# Patient Record
Sex: FEMALE | Race: WHITE | Employment: FULL TIME | URBAN - METROPOLITAN AREA
[De-identification: names, ages, dates, MRNs, and addresses within clinical notes are randomized per-mention and may not be internally consistent; named-entity substitution may affect disease eponyms.]

---

## 2017-11-30 ENCOUNTER — OFFICE VISIT (OUTPATIENT)
Dept: FAMILY MEDICINE | Facility: CLINIC | Age: 47
End: 2017-11-30
Payer: COMMERCIAL

## 2017-11-30 VITALS
BODY MASS INDEX: 24.51 KG/M2 | WEIGHT: 133.2 LBS | HEIGHT: 62 IN | HEART RATE: 71 BPM | SYSTOLIC BLOOD PRESSURE: 101 MMHG | DIASTOLIC BLOOD PRESSURE: 61 MMHG | TEMPERATURE: 97.8 F

## 2017-11-30 DIAGNOSIS — M54.42 ACUTE BILATERAL LOW BACK PAIN WITH LEFT-SIDED SCIATICA: Primary | ICD-10-CM

## 2017-11-30 DIAGNOSIS — K59.00 CONSTIPATION, UNSPECIFIED CONSTIPATION TYPE: ICD-10-CM

## 2017-11-30 PROCEDURE — 99213 OFFICE O/P EST LOW 20 MIN: CPT | Performed by: NURSE PRACTITIONER

## 2017-11-30 RX ORDER — ACETAMINOPHEN 500 MG
1000 TABLET ORAL EVERY 6 HOURS PRN
COMMUNITY
End: 2023-08-15

## 2017-11-30 RX ORDER — CYCLOBENZAPRINE HCL 10 MG
5-10 TABLET ORAL 3 TIMES DAILY PRN
Qty: 30 TABLET | Refills: 1 | Status: SHIPPED | OUTPATIENT
Start: 2017-11-30 | End: 2018-01-04

## 2017-11-30 RX ORDER — IBUPROFEN 200 MG
600 TABLET ORAL EVERY 6 HOURS PRN
COMMUNITY
End: 2023-08-15

## 2017-11-30 RX ORDER — HYDROCODONE BITARTRATE AND ACETAMINOPHEN 5; 325 MG/1; MG/1
1-2 TABLET ORAL EVERY 4 HOURS PRN
Qty: 10 TABLET | Refills: 0 | Status: SHIPPED | OUTPATIENT
Start: 2017-11-30 | End: 2018-01-04

## 2017-11-30 ASSESSMENT — PAIN SCALES - GENERAL: PAINLEVEL: SEVERE PAIN (7)

## 2017-11-30 NOTE — PATIENT INSTRUCTIONS
Thank you for choosing [facility].  You may be receiving a survey in the mail from Rohan Jaimes regarding your visit today.  Please take a few minutes to complete and return the survey to let us know how we are doing.      If you have questions or concerns, please contact us via Visual Realm or you can contact your care team at [phone].    Our Clinic hours are:  Monday 6:40 am  to 7:00 pm  Tuesday -Friday 6:40 am to 5:00 pm    The [place] outpatient lab hours are:  Monday - Friday 6:10 am to 4:45 pm  Saturdays 7:00 am to 11:00 am  Appointments are required, call [phone]    If you have clinical questions after hours or would like to schedule an appointment,  call the clinic at [phone].

## 2017-11-30 NOTE — MR AVS SNAPSHOT
After Visit Summary   11/30/2017    Deneen Hyde    MRN: [id number]           Patient Information     Date Of Birth          1970        Visit Information        Provider Department      11/30/2017 1:40 PM Leann Cisneros APRN [facility]        Today's Diagnoses     Acute bilateral low back pain with left-sided sciatica    -  1      Care Instructions          Thank you for choosing [facility].  You may be receiving a survey in the mail from Rohan LermaNBA [employer] regarding your visit today.  Please take a few minutes to complete and return the survey to let us know how we are doing.      If you have questions or concerns, please contact us via Yasuu or you can contact your care team at [phone].    Our Clinic hours are:  Monday 6:40 am  to 7:00 pm  Tuesday -Friday 6:40 am to 5:00 pm    The [place] outpatient lab hours are:  Monday - Friday 6:10 am to 4:45 pm  Saturdays 7:00 am to 11:00 am  Appointments are required, call [phone]    If you have clinical questions after hours or would like to schedule an appointment,  call the clinic at [phone].            Follow-ups after your visit        Additional Services     ORTHO  REFERRAL       [facility] is referring you to the Orthopedic  Services at [facility].       The  Representative will assist you in the coordination of your Orthopedic and Musculoskeletal Care as prescribed by your physician.    The  Representative will call you within 1 business day to help schedule your appointment, or you may contact the  Representative at:    All areas ~ [phone]     Type of Referral : Non Surgical       Timeframe requested: Routine    Coverage of these services is subject to the terms and limitations of your health insurance plan.  Please call member services at your health plan with any benefit or coverage questions.      If X-rays, CT  "or MRI's have been performed, please contact the facility where they were done to arrange for , prior to your scheduled appointment.  Please bring this referral request to your appointment and present it to your specialist.            PHYSICAL THERAPY REFERRAL       *This therapy referral will be filtered to a centralized scheduling office at [facility] and the patient will receive a call to schedule an appointment at a [facility] location most convenient for them. *     [facility] provides Physical Therapy evaluation and treatment and many specialty services across the [facility] system.  If requesting a specialty program, please choose from the list below.    If you have not heard from the scheduling office within 2 business days, please call [phone] for all locations, with the exception of [place], please call [phone].  Treatment: Evaluation & Treatment  Special Instructions/Modalities:   Special Programs: None    Please be aware that coverage of these services is subject to the terms and limitations of your health insurance plan.  Call member services at your health plan with any benefit or coverage questions.      **Note to Provider:  If you are referring outside of [facility] for the therapy appointment, please list the name of the location in the \"special instructions\" above, print the referral and give to the patient to schedule the appointment.                  Who to contact     If you have questions or need follow up information about today's clinic visit or your schedule please contact [facility] directly at [phone].  Normal or non-critical lab and imaging results will be communicated to you by MyChart, letter or phone within 4 business days after the clinic has received the results. If you do not hear from us within 7 days, please contact the clinic through MyChart or phone. If you have a critical or abnormal lab result, we will " "notify you by phone as soon as possible.  Submit refill requests through [employer] or call your pharmacy and they will forward the refill request to us. Please allow 3 business days for your refill to be completed.          Additional Information About Your Visit        ImpulcityharGetMyBoat Information     [employer] lets you send messages to your doctor, view your test results, renew your prescriptions, schedule appointments and more. To sign up, go to [email or web address] . Click on \"Log in\" on the left side of the screen, which will take you to the Welcome page. Then click on \"Sign up Now\" on the right side of the page.     You will be asked to enter the access code listed below, as well as some personal information. Please follow the directions to create your username and password.     Your access code is: [email or web address]  Expires: 2018  2:14 PM     Your access code will  in 90 days. If you need help or a new code, please call your [place] clinic or [phone].        Care EveryWhere ID     This is your Care EveryWhere ID. This could be used by other organizations to access your [place] medical records  [id number]        Your Vitals Were     Pulse Temperature Height BMI (Body Mass Index)          71 97.8  F (36.6  C) (Tympanic) 5' 2\" (1.575 m) 24.36 kg/m2         Blood Pressure from Last 3 Encounters:   17 101/61    Weight from Last 3 Encounters:   17 133 lb 3.2 oz (60.4 kg)              We Performed the Following     ORTHO  REFERRAL     PHYSICAL THERAPY REFERRAL          Today's Medication Changes          These changes are accurate as of: 17  2:14 PM.  If you have any questions, ask your nurse or doctor.               Start taking these medicines.        Dose/Directions    cyclobenzaprine 10 MG tablet   Commonly known as:  FLEXERIL   Used for:  Acute bilateral low back pain with left-sided sciatica   Started by:  Leann Cisneros APRN CNP        Dose:  5-10 mg   Take 0.5-1 tablets " (5-10 mg) by mouth 3 times daily as needed for muscle spasms   Quantity:  30 tablet   Refills:  1       HYDROcodone-acetaminophen 5-325 MG per tablet   Commonly known as:  NORCO   Used for:  Acute bilateral low back pain with left-sided sciatica   Started by:  Leann Cisneros APRN CNP        Dose:  1-2 tablet   Take 1-2 tablets by mouth every 4 hours as needed for moderate to severe pain maximum 2 tablet(s) per day   Quantity:  10 tablet   Refills:  0            Where to get your medicines      These medications were sent to [place] [address] [facility] - [address] [address]     Phone:  [phone]     cyclobenzaprine 10 MG tablet         Some of these will need a paper prescription and others can be bought over the counter.  Ask your nurse if you have questions.     Bring a paper prescription for each of these medications     HYDROcodone-acetaminophen 5-325 MG per tablet                Primary Care Provider Office Phone # Fax #    [facility] [phone] [address] [facility] [address]        Equal Access to Services     BOAZ HERNANDEZ : Hadii akira pugh hadasho Sozelalem, waaxda luqadaha, qaybta kaalmada adeduyen, otny solano. So [facility] [phone].    ATENCIÓN: Si habla español, tiene a mohamud disposición servicios gratuitos de asistencia lingüística. Giuliana al [phone].    We comply with applicable federal civil rights laws and [place] laws. We do not discriminate on the basis of race, color, national origin, age, disability, sex, sexual orientation, or gender identity.            Thank you!     Thank you for choosing [facility]  for your care. Our goal is always to provide you with excellent care. Hearing back from our patients is one way we can continue to improve our services. Please take a few minutes to complete the written survey that you may receive in the mail after your  visit with us. Thank you!             Your Updated Medication List - Protect others around you: Learn how to safely use, store and throw away your medicines at www.disposemymeds.org.          This list is accurate as of: 11/30/17  2:14 PM.  Always use your most recent med list.                   Brand Name Dispense Instructions for use Diagnosis    acetaminophen 500 MG tablet    TYLENOL     Take 1,000 mg by mouth every 6 hours as needed for mild pain        CALCIUM PO      1 tablet daily when she remembers , unknown dose        cyclobenzaprine 10 MG tablet    FLEXERIL    30 tablet    Take 0.5-1 tablets (5-10 mg) by mouth 3 times daily as needed for muscle spasms    Acute bilateral low back pain with left-sided sciatica       HYDROcodone-acetaminophen 5-325 MG per tablet    NORCO    10 tablet    Take 1-2 tablets by mouth every 4 hours as needed for moderate to severe pain maximum 2 tablet(s) per day    Acute bilateral low back pain with left-sided sciatica       ibuprofen 200 MG tablet    ADVIL/MOTRIN     Take 600 mg by mouth every 6 hours as needed for mild pain

## 2017-11-30 NOTE — PROGRESS NOTES
SUBJECTIVE:   Deneen Hyde is a 47 year old female who presents to clinic today for the following health issues:      Back Pain       Duration:  1 week         Specific cause: possibly from sleeping on her stomach     Description:   Location of pain: low back left  Character of pain: sharp, dull ache and stabbing, tightness  Pain radiation:none  New numbness or weakness in legs, not attributed to pain:  no     Intensity: Currently 6-7/10, At its worst 8-9/10    History:   Pain interferes with job: YES  History of back problems: no prior back problems  Any previous MRI or X-rays: None  Sees a specialist for back pain:  No  Therapies tried without relief: acetaminophen (Tylenol), cold and NSAIDs    Alleviating factors:   Improved by: standing up      Precipitating factors:  Worsened by: Lifting, Bending, Sitting and Lying Flat    Functional and Psychosocial Screen (Rebecca STarT Back):      Not performed today          Accompanying Signs & Symptoms:  Risk of Fracture:  None  Risk of Cauda Equina:  None  Risk of Infection:  None  Risk of Cancer:  None  Risk of Ankylosing Spondylitis:  Onset at age <35, male, AND morning back stiffness. no     No previous injury-   Progressively getting worse in past week  Stabbing pain lower back into left side. Pain does not radiate. No numbness or tingling or weakness in legs No other significant past medical history or family history. Changes with bowel or bladder.   Has problems with certain movements.     Constipation- feels constipated a lot- does not want to use      -------------------------------------    Problem list and histories reviewed & adjusted, as indicated.  Additional history: as documented    There is no problem list on file for this patient.    History reviewed. No pertinent surgical history.    Social History   Substance Use Topics     Smoking status: Former Smoker     Quit date: 11/30/2007     Smokeless tobacco: Never Used     Alcohol use No     Family  "History   Problem Relation Age of Onset     Thyroid Disease Mother      Suicide Father      Suicide Sister      Thyroid Disease Sister              Reviewed and updated as needed this visit by clinical staffTobacco  Allergies  Meds  Med Hx  Surg Hx  Fam Hx  Soc Hx      Reviewed and updated as needed this visit by Provider         ROS:  Constitutional, HEENT, cardiovascular, pulmonary, GI, , musculoskeletal, neuro, skin, endocrine and psych systems are negative, except as otherwise noted.      OBJECTIVE:   /61 (BP Location: Left arm, Patient Position: Chair, Cuff Size: Adult Large)  Pulse 71  Temp 97.8  F (36.6  C) (Tympanic)  Ht 5' 2\" (1.575 m)  Wt 133 lb 3.2 oz (60.4 kg)  BMI 24.36 kg/m2  Body mass index is 24.36 kg/(m^2).  GENERAL: healthy, alert and no distress  NECK: no adenopathy, no asymmetry, masses, or scars and thyroid normal to palpation  RESP: lungs clear to auscultation - no rales, rhonchi or wheezes  CV: regular rate and rhythm, normal S1 S2, no S3 or S4, no murmur, click or rub, no peripheral edema and peripheral pulses strong  MS: no gross musculoskeletal defects noted, no edema    Diagnostic Test Results:  none     ASSESSMENT/PLAN:       1. Acute bilateral low back pain with left-sided sciatica    - cyclobenzaprine (FLEXERIL) 10 MG tablet; Take 0.5-1 tablets (5-10 mg) by mouth 3 times daily as needed for muscle spasms  Dispense: 30 tablet; Refill: 1  - HYDROcodone-acetaminophen (NORCO) 5-325 MG per tablet; Take 1-2 tablets by mouth every 4 hours as needed for moderate to severe pain maximum 2 tablet(s) per day  Dispense: 10 tablet; Refill: 0  - PHYSICAL THERAPY REFERRAL  - ORTHO  REFERRAL    2. Constipation  - encouraged to increase fiber - water in diet  - Start Miralax     GIOVANI Delong [facility]  "

## 2017-11-30 NOTE — NURSING NOTE
"Chief Complaint   Patient presents with     Back Pain     Health Maintenance     declines flu shot, unsure if still needs pappe, had hysterectomy last year, not sure what type.        Initial /61 (BP Location: Left arm, Patient Position: Chair, Cuff Size: Adult Large)  Pulse 71  Temp 97.8  F (36.6  C) (Tympanic)  Ht 5' 2\" (1.575 m)  Wt 133 lb 3.2 oz (60.4 kg)  BMI 24.36 kg/m2 Estimated body mass index is 24.36 kg/(m^2) as calculated from the following:    Height as of this encounter: 5' 2\" (1.575 m).    Weight as of this encounter: 133 lb 3.2 oz (60.4 kg).  Medication Reconciliation: complete    "

## 2017-12-04 ENCOUNTER — HOSPITAL ENCOUNTER (OUTPATIENT)
Dept: PHYSICAL THERAPY | Facility: CLINIC | Age: 47
Setting detail: THERAPIES SERIES
End: 2017-12-04
Attending: NURSE PRACTITIONER
Payer: COMMERCIAL

## 2017-12-04 PROCEDURE — 97161 PT EVAL LOW COMPLEX 20 MIN: CPT | Mod: GP | Performed by: PHYSICAL THERAPIST

## 2017-12-04 PROCEDURE — 97110 THERAPEUTIC EXERCISES: CPT | Mod: GP | Performed by: PHYSICAL THERAPIST

## 2017-12-04 PROCEDURE — 40000718 ZZHC STATISTIC PT DEPARTMENT ORTHO VISIT: Performed by: PHYSICAL THERAPIST

## 2017-12-04 PROCEDURE — 97140 MANUAL THERAPY 1/> REGIONS: CPT | Mod: GP | Performed by: PHYSICAL THERAPIST

## 2017-12-04 NOTE — PROGRESS NOTES
Deneen Hyde   PHYSICAL THERAPY EVALUATION    12/04/17 0800   General Information   Type of Visit Initial OP Ortho PT Evaluation   Start of Care Date 12/04/17   Referring Physician NEGRA Cisneros NP   Patient/Family Goals Statement decrease pain, be able to move, get back to exercising   Orders Evaluate and Treat   Date of Order 11/30/17   Insurance Type Private   Medical Diagnosis LBP   Surgical/Medical history reviewed Yes  (hysterectomy yr ago, )   Body Part(s)   Body Part(s) Lumbar Spine/SI   Presentation and Etiology   Pertinent history of current problem (include personal factors and/or comorbidities that impact the POC) LBP slowly getting worse since Thanksgiving. sx can go to  L butt.  Thinks from sleeping on her stomach.  Sx increase getting up from sitting, getting out of bed, bending.  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  Pain can be 8/10 at worst getting out of bed, 5/10 average.   Onset date of current episode/exacerbation 11/22/17   Prior Level of Function   Functional Level Prior Comment works, house, yardwork, 3 kids   Current Level of Function   Patient role/employment history A. Employed   Employment Comments teacher K-5   Fall Risk Screen   Fall screen completed by PT   Have you fallen 2 or more times in the past year? No   Have you fallen and had an injury in the past year? No   Is patient a fall risk? No   Lumbar Spine/SI Objective Findings   Posture L ot R scoliosis starting in mid to lower T spine, deep lordotic curve in upper lumbar   Flexion ROM 10% increased pain   Extension ROM 80%    Right Side Bending ROM 75% no curve in spine   Left Side Bending ROM 50% pain L   Lumbar ROM Comment full supine flexion, stretching LB, and eventually felt better   Pelvic Screen L post ilium, (or upslip)   Hip Screen ROM WNL   Hamstring Flexibility WNL   Piriformis Flexibility pulls L   SLR neg   Crossover SLR neg   Slump Test painful in LB without extending leg - however forward bending  stretch, pulled but felt good   Segmental Mobility ERSL L3-4, FRSR L1   Palpation tender L SI, piriformis   Planned Therapy Interventions   Planned Therapy Interventions stretching;strengthening;manual therapy;joint mobilization   Clinical Impression   Criteria for Skilled Therapeutic Interventions Met yes, treatment indicated   PT Diagnosis LBP   Functional limitations due to impairments changing positions, getting out of bed, up from chair, bending   Clinical Presentation Evolving/Changing   Clinical Presentation Rationale no factors affecting plan   Clinical Decision Making (Complexity) Low complexity   Therapy Frequency 1 time/week   Predicted Duration of Therapy Intervention (days/wks) 4-6wks   Risk & Benefits of therapy have been explained Yes   Patient, Family & other staff in agreement with plan of care Yes   Education Assessment   Preferred Learning Style Listening   Barriers to Learning No barriers   ORTHO GOALS   PT Ortho Eval Goals 1;2   Ortho Goal 1   Goal Description pt will be able to do all bed mobility without LBP in 4wk   Target Date 01/04/18   Ortho Goal 2   Goal Description pt will have full LROM without pain for improved ADLs, house tasks in 6wk   Target Date 01/15/18   Total Evaluation Time   Total Evaluation Time 20   Kris Hoenk, PT #[id number]  [facility]

## 2017-12-21 ENCOUNTER — OFFICE VISIT (OUTPATIENT)
Dept: FAMILY MEDICINE | Facility: CLINIC | Age: 47
End: 2017-12-21
Payer: COMMERCIAL

## 2017-12-21 VITALS
TEMPERATURE: 99 F | DIASTOLIC BLOOD PRESSURE: 68 MMHG | HEART RATE: 72 BPM | SYSTOLIC BLOOD PRESSURE: 92 MMHG | BODY MASS INDEX: 24.11 KG/M2 | WEIGHT: 131.8 LBS

## 2017-12-21 DIAGNOSIS — K40.90 UNILATERAL INGUINAL HERNIA WITHOUT OBSTRUCTION OR GANGRENE, RECURRENCE NOT SPECIFIED: Primary | ICD-10-CM

## 2017-12-21 PROCEDURE — 99213 OFFICE O/P EST LOW 20 MIN: CPT | Performed by: FAMILY MEDICINE

## 2017-12-21 NOTE — NURSING NOTE
"Initial BP 92/68  Pulse 72  Temp 99  F (37.2  C) (Tympanic)  Wt 131 lb 12.8 oz (59.8 kg)  BMI 24.11 kg/m2 Estimated body mass index is 24.11 kg/(m^2) as calculated from the following:    Height as of 11/30/17: 5' 2\" (1.575 m).    Weight as of this encounter: 131 lb 12.8 oz (59.8 kg). .      "

## 2017-12-21 NOTE — MR AVS SNAPSHOT
After Visit Summary   12/21/2017    Deneen Hyde    MRN: [id number]           Patient Information     Date Of Birth          1970        Visit Information        Provider Department      12/21/2017 2:00 PM Pallavi Montalvo MD [facility]        Today's Diagnoses     Unilateral inguinal hernia without obstruction or gangrene, recurrence not specified    -  1       Follow-ups after your visit        Additional Services     GENERAL SURG ADULT REFERRAL       Your provider has referred you to: [facility]: [facility] [phone]   [email or web address]    Please be aware that coverage of these services is subject to the terms and limitations of your health insurance plan.  Call member services at your health plan with any benefit or coverage questions.      Please bring the following with you to your appointment:    (1) Any X-Rays, CTs or MRIs which have been performed.  Contact the facility where they were done to arrange for  prior to your scheduled appointment.   (2) List of current medications   (3) This referral request   (4) Any documents/labs given to you for this referral                  Who to contact     If you have questions or need follow up information about today's clinic visit or your schedule please contact [facility] directly at [phone].  Normal or non-critical lab and imaging results will be communicated to you by MyChart, letter or phone within 4 business days after the clinic has received the results. If you do not hear from us within 7 days, please contact the clinic through MyChart or phone. If you have a critical or abnormal lab result, we will notify you by phone as soon as possible.  Submit refill requests through [employer] or call your pharmacy and they will forward the refill request to us. Please allow 3 business days for your refill to be completed.          Additional Information  "About Your Visit        MyChart Information     [employer] lets you send messages to your doctor, view your test results, renew your prescriptions, schedule appointments and more. To sign up, go to [email or web address] . Click on \"Log in\" on the left side of the screen, which will take you to the Welcome page. Then click on \"Sign up Now\" on the right side of the page.     You will be asked to enter the access code listed below, as well as some personal information. Please follow the directions to create your username and password.     Your access code is: [email or web address]  Expires: 2018  2:14 PM     Your access code will  in 90 days. If you need help or a new code, please call your [place] clinic or [phone].        Care EveryWhere ID     This is your Care EveryWhere ID. This could be used by other organizations to access your [place] medical records  [id number]        Your Vitals Were     Pulse Temperature BMI (Body Mass Index)             72 99  F (37.2  C) (Tympanic) 24.11 kg/m2          Blood Pressure from Last 3 Encounters:   17 92/68   17 101/61    Weight from Last 3 Encounters:   17 131 lb 12.8 oz (59.8 kg)   17 133 lb 3.2 oz (60.4 kg)              We Performed the Following     GENERAL SURG ADULT REFERRAL        Primary Care Provider Office Phone # Fax #    Leann Cisneros, APRN [facility] [phone] [address] [facility] [address]        Equal Access to Services     : Hadii akira pugh hadasho Soomaali, waaxda luqadaha, qaybta kaalmada manda, tony ortega . So [facility] [phone].    ATENCIÓN: Si habla español, tiene a mohamud disposición servicios gratuitos de asistencia lingüística. Llame al [phone].    We comply with applicable federal civil rights laws and [place] laws. We do not discriminate on the basis of race, color, national origin, age, disability, sex, sexual orientation, or gender identity.       "      Thank you!     Thank you for choosing [facility]  for your care. Our goal is always to provide you with excellent care. Hearing back from our patients is one way we can continue to improve our services. Please take a few minutes to complete the written survey that you may receive in the mail after your visit with us. Thank you!             Your Updated Medication List - Protect others around you: Learn how to safely use, store and throw away your medicines at www.disposemymeds.org.          This list is accurate as of: 12/21/17  2:18 PM.  Always use your most recent med list.                   Brand Name Dispense Instructions for use Diagnosis    acetaminophen 500 MG tablet    TYLENOL     Take 1,000 mg by mouth every 6 hours as needed for mild pain        CALCIUM PO      1 tablet daily when she remembers , unknown dose        cyclobenzaprine 10 MG tablet    FLEXERIL    30 tablet    Take 0.5-1 tablets (5-10 mg) by mouth 3 times daily as needed for muscle spasms    Acute bilateral low back pain with left-sided sciatica       HYDROcodone-acetaminophen 5-325 MG per tablet    NORCO    10 tablet    Take 1-2 tablets by mouth every 4 hours as needed for moderate to severe pain maximum 2 tablet(s) per day    Acute bilateral low back pain with left-sided sciatica       ibuprofen 200 MG tablet    ADVIL/MOTRIN     Take 600 mg by mouth every 6 hours as needed for mild pain

## 2017-12-21 NOTE — PROGRESS NOTES
SUBJECTIVE:                                                    Deneen Hyde is 47 year old female   Chief Complaint   Patient presents with     Mass     Lump in right lower abdominal/groin area, has grown in size. Dull pain in the evening. Could be stress related. No treatment to date.         Problem list and histories reviewed & adjusted, as indicated.  Additional history: very active at work, not a lot of lifting though.  No pain or nausea    There is no problem list on file for this patient.    History reviewed. No pertinent surgical history.    Social History   Substance Use Topics     Smoking status: Former Smoker     Quit date: 11/30/2007     Smokeless tobacco: Never Used     Alcohol use No     Family History   Problem Relation Age of Onset     Thyroid Disease Mother      Suicide Father      Suicide Sister      Thyroid Disease Sister      Depression Daughter          Current Outpatient Prescriptions   Medication Sig Dispense Refill     ibuprofen (ADVIL/MOTRIN) 200 MG tablet Take 600 mg by mouth every 6 hours as needed for mild pain       CALCIUM PO 1 tablet daily when she remembers , unknown dose       acetaminophen (TYLENOL) 500 MG tablet Take 1,000 mg by mouth every 6 hours as needed for mild pain       cyclobenzaprine (FLEXERIL) 10 MG tablet Take 0.5-1 tablets (5-10 mg) by mouth 3 times daily as needed for muscle spasms (Patient not taking: Reported on 12/21/2017) 30 tablet 1     HYDROcodone-acetaminophen (NORCO) 5-325 MG per tablet Take 1-2 tablets by mouth every 4 hours as needed for moderate to severe pain maximum 2 tablet(s) per day (Patient not taking: Reported on 12/21/2017) 10 tablet 0     Allergies   Allergen Reactions     Orange Fruit [Citrus] Anaphylaxis     Tomatoes [Tomato] Anaphylaxis     Sulfa Drugs Swelling     PN: eyes swelled     Trimethoprim      PN: eyes swelling / itching     No lab results found.   BP Readings from Last 3 Encounters:   12/21/17 92/68   11/30/17 101/61    Wt  Readings from Last 3 Encounters:   12/21/17 131 lb 12.8 oz (59.8 kg)   11/30/17 133 lb 3.2 oz (60.4 kg)         ROS:  Constitutional, HEENT, cardiovascular, pulmonary, gi and gu systems are negative, except as otherwise noted.    OBJECTIVE:                                                    BP 92/68  Pulse 72  Temp 99  F (37.2  C) (Tympanic)  Wt 131 lb 12.8 oz (59.8 kg)  BMI 24.11 kg/m2  GENERAL APPEARANCE ADULT: Alert, no acute distress, fit appearing  ABDOMEN: soft, no organomegaly, masses or tenderness, hernia right and inguinal reduced, not tender  MS: extremities normal, no peripheral edema  SKIN: no suspicious lesions or rashes  NEURO: Alert, oriented, speech and mentation normal  PSYCH: mentation appears normal., affect and mood normal  Diagnostic Test Results:  none      ASSESSMENT/PLAN:                                                    1. Unilateral inguinal hernia without obstruction or gangrene, recurrence not specified  Reviewed options, Deneen would like to get fixed.  - GENERAL SURG ADULT REFERRAL    Pallavi Montalvo MD  [facility]'

## 2018-01-02 NOTE — ADDENDUM NOTE
Encounter addended by: Hoenk, Kris, PT on: 1/2/2018  9:13 AM<BR>     Actions taken: Sign clinical note, Episode resolved

## 2018-01-02 NOTE — PROGRESS NOTES
Discharge Note -Physical Therapy    NAME:  Deneen Hyde  MRN:   [id number]    S:    Pt did not follow up for therapy as recommended.    O:  Objective information is not available as pt has not returned for therapy.  Last objective information or progress note will serve as final entry.    A:   Pt did not return for further treatment.    Status of goals is unknown due to lack of followup by patient.    P:  Discharge from PT this date.      Kris Hoenk, PT #[id number]  [facility]

## 2018-01-03 ENCOUNTER — TELEPHONE (OUTPATIENT)
Dept: FAMILY MEDICINE | Facility: CLINIC | Age: 48
End: 2018-01-03

## 2018-01-03 NOTE — TELEPHONE ENCOUNTER
Reason for Call:  Other call back    Detailed comments: Pt had on 12/21/17 and was diagnosed with hernia on rt side. Is also having piercing, pinching pains on left side. Has had spriatically for last 6 months and didn't think much of them. Should she be seen again or wait and schedule appt with surgeon for hernia and ask them? She was also seen for back pain 11/30/17. Could this all be related? Please advise.    Phone Number Patient can be reached at: Home number on file [phone] (home)    Best Time: any    Can we leave a detailed message on this number? YES    Call taken on 1/3/2018 at 8:32 AM by Brook Mcnair

## 2018-01-03 NOTE — TELEPHONE ENCOUNTER
S-(situation): pain    B-(background): evaluated in the clinic 11/30 & 12/21 but having symptoms that she isn't sure if they are related to her hernia    A-(assessment): found a lump is a hernia on 12/21. Would get sharp pains in abdomen and not sure if it is related to the hernia. Usually very healthy so she is worried. When she has the sharp pains they last a min or two and they are dull for several minutes. She can work through them. She has to be careful how she moves. The pains double her over and then she can slowly get back to what she is doing. She does notice the lump sticks out more in the evening than in the morning. There is also a new lump on the other side she noted yesterday.  The sharp pain happen on the other side of the lump and are higher. Pains are at the bikini level when she has them. Had a LEEP procedure and hysterectomy in early 2000's.     R-(recommendations): advised she should see someone in the clinic to rule out anything new problems then follow up with surgery clinic as advised

## 2018-01-04 ENCOUNTER — HOSPITAL ENCOUNTER (OUTPATIENT)
Dept: CT IMAGING | Facility: CLINIC | Age: 48
Discharge: HOME | End: 2018-01-04
Attending: NURSE PRACTITIONER | Admitting: NURSE PRACTITIONER
Payer: COMMERCIAL

## 2018-01-04 ENCOUNTER — OFFICE VISIT (OUTPATIENT)
Dept: FAMILY MEDICINE | Facility: CLINIC | Age: 48
End: 2018-01-04
Payer: COMMERCIAL

## 2018-01-04 VITALS
OXYGEN SATURATION: 98 % | WEIGHT: 133.2 LBS | HEART RATE: 75 BPM | TEMPERATURE: 98.3 F | RESPIRATION RATE: 16 BRPM | SYSTOLIC BLOOD PRESSURE: 94 MMHG | DIASTOLIC BLOOD PRESSURE: 62 MMHG | HEIGHT: 62 IN | BODY MASS INDEX: 24.51 KG/M2

## 2018-01-04 DIAGNOSIS — G89.29 CHRONIC LEFT-SIDED LOW BACK PAIN WITHOUT SCIATICA: ICD-10-CM

## 2018-01-04 DIAGNOSIS — R10.32 ABDOMINAL PAIN, LEFT LOWER QUADRANT: ICD-10-CM

## 2018-01-04 DIAGNOSIS — R10.32 ABDOMINAL PAIN, LEFT LOWER QUADRANT: Primary | ICD-10-CM

## 2018-01-04 DIAGNOSIS — M54.50 CHRONIC LEFT-SIDED LOW BACK PAIN WITHOUT SCIATICA: ICD-10-CM

## 2018-01-04 LAB
ALBUMIN UR-MCNC: NEGATIVE MG/DL
APPEARANCE UR: CLEAR
BILIRUB UR QL STRIP: NEGATIVE
COLOR UR AUTO: YELLOW
GLUCOSE UR STRIP-MCNC: NEGATIVE MG/DL
HGB UR QL STRIP: NEGATIVE
KETONES UR STRIP-MCNC: NEGATIVE MG/DL
LEUKOCYTE ESTERASE UR QL STRIP: NEGATIVE
NITRATE UR QL: NEGATIVE
PH UR STRIP: 6 PH (ref 5–7)
SOURCE: NORMAL
SP GR UR STRIP: <=1.005 (ref 1–1.03)
UROBILINOGEN UR STRIP-ACNC: 0.2 EU/DL (ref 0.2–1)

## 2018-01-04 PROCEDURE — 25000125 ZZHC RX 250: Performed by: RADIOLOGY

## 2018-01-04 PROCEDURE — 74177 CT ABD & PELVIS W/CONTRAST: CPT

## 2018-01-04 PROCEDURE — 81003 URINALYSIS AUTO W/O SCOPE: CPT | Performed by: NURSE PRACTITIONER

## 2018-01-04 PROCEDURE — 99214 OFFICE O/P EST MOD 30 MIN: CPT | Performed by: NURSE PRACTITIONER

## 2018-01-04 PROCEDURE — 25000128 H RX IP 250 OP 636: Performed by: RADIOLOGY

## 2018-01-04 RX ORDER — IOPAMIDOL 755 MG/ML
65 INJECTION, SOLUTION INTRAVASCULAR ONCE
Status: COMPLETED | OUTPATIENT
Start: 2018-01-04 | End: 2018-01-04

## 2018-01-04 RX ADMIN — SODIUM CHLORIDE 57 ML: 9 INJECTION, SOLUTION INTRAVENOUS at 15:09

## 2018-01-04 RX ADMIN — IOPAMIDOL 65 ML: 755 INJECTION, SOLUTION INTRAVENOUS at 15:09

## 2018-01-04 ASSESSMENT — PAIN SCALES - GENERAL: PAINLEVEL: NO PAIN (0)

## 2018-01-04 NOTE — PROGRESS NOTES
SUBJECTIVE:   Deneen Hyde is a 47 year old female who presents to clinic today for the following health issues:    Patient was diagnosed with hernia in right side.     ABDOMINAL   PAIN     Onset: was seen by Dr. Montalvo 12/21/17, diagnosed with hernia right side, now has had intermittent abdominal pain over the past 6 months.      Description: pain started last 6 months- Intermittent pain - lasting up to 1 day-     No blood in stool or urine. No changes with BM's.  Symptoms are not aggravated with eating. No fever , no nausea or vomiting .   Character: shooting and sharp, stabbing, doubles her over, dull pain after the sharp   Location: left middle to  lower quadrant  Radiation: None    Intensity: 0/10 currently, 9/10 at worst    Progression of Symptoms:  Same intensity, occurring more frequently    Accompanying Signs & Symptoms:  Fever/Chills?: no   Gas/Bloating: YES- feels a little bloated, has trouble passing gas due to the hernia  Nausea: YES- maybe slight  Vomitting: no   Diarrhea?: no   Constipation:YES- maybe slight  Dysuria or Hematuria: no    History:   Trauma: no   Previous similar pain: YES- has been ongoing for the past couple of years   Previous tests done: none    Precipitating factors:   Does the pain change with:     Food: no, eats very healthy    BM: no     Urination: no     Alleviating factors:  none    Therapies Tried and outcome: ibuprofen occasionally, does not seem to help    LMP:  not applicable    History of hysterectomy- still has ovaries.   No colonoscopy- no history of diverticulitis -  thinks she has diverticulitis because same symptoms he had.          -------------------------------------    Problem list and histories reviewed & adjusted, as indicated.  Additional history: as documented    There is no problem list on file for this patient.    History reviewed. No pertinent surgical history.    Social History   Substance Use Topics     Smoking status: Former Smoker     Quit  "date: 11/30/2007     Smokeless tobacco: Never Used     Alcohol use No     Family History   Problem Relation Age of Onset     Thyroid Disease Mother      Suicide Father      Suicide Sister      Thyroid Disease Sister      Depression Daughter              Reviewed and updated as needed this visit by clinical staff     Reviewed and updated as needed this visit by Provider         ROS:  Constitutional, HEENT, cardiovascular, pulmonary, GI, , musculoskeletal, neuro, skin, endocrine and psych systems are negative, except as otherwise noted.      OBJECTIVE:   BP 94/62 (BP Location: Left arm, Patient Position: Chair, Cuff Size: Adult Regular)  Pulse 75  Temp 98.3  F (36.8  C) (Tympanic)  Resp 16  Ht 5' 2\" (1.575 m)  Wt 133 lb 3.2 oz (60.4 kg)  SpO2 98%  BMI 24.36 kg/m2  Body mass index is 24.36 kg/(m^2).  GENERAL: healthy, alert and no distress  NECK: no adenopathy, no asymmetry, masses, or scars and thyroid normal to palpation  RESP: lungs clear to auscultation - no rales, rhonchi or wheezes  CV: regular rate and rhythm, normal S1 S2, no S3 or S4, no murmur, click or rub, no peripheral edema and peripheral pulses strong  ABDOMEN: soft, nontender, no hepatosplenomegaly, no masses and bowel sounds normal  MS: no gross musculoskeletal defects noted, no edema    Diagnostic Test Results:  Results for orders placed or performed in visit on 01/04/18   UA reflex to Microscopic   Result Value Ref Range    Color Urine Yellow     Appearance Urine Clear     Glucose Urine Negative NEG^Negative mg/dL    Bilirubin Urine Negative NEG^Negative    Ketones Urine Negative NEG^Negative mg/dL    Specific Gravity Urine <=1.005 1.003 - 1.035    Blood Urine Negative NEG^Negative    pH Urine 6.0 5.0 - 7.0 pH    Protein Albumin Urine Negative NEG^Negative mg/dL    Urobilinogen Urine 0.2 0.2 - 1.0 EU/dL    Nitrite Urine Negative NEG^Negative    Leukocyte Esterase Urine Negative NEG^Negative    Source Midstream Urine        ASSESSMENT/PLAN: "       1. Abdominal pain, left lower quadrant  ? Ovarian cyst vs diverticulitis vs constipation vs UTI  - will order CT scan since symptoms ongoing for past 6 months  - CT Abdomen Pelvis w Contrast; Future  - UA reflex to Microscopic    2. Chronic left-sided low back pain without sciatica  Will order CT scan to see if back pain is due to abdominal /cyst etiology  - CT Abdomen Pelvis w Contrast; Future    Addendum: CT results  IMPRESSION:    Abdomen: The cecum is mobile and is positioned more in the central  aspect of the abdomen. There is a moderate-large amount of stool in  the colon. No dilated bowel loops are seen. No focal inflammatory  process is demonstrated.    1. No acute finding nor specific reason for left lower quadrant pain  is demonstrated.  2. Incidentally seen is chronic spondylolisthesis at L4.    - recommend that patient follow up with sports medicine /pain clinic for her spondylolisthesis which is likely the cause of her back pain and start taking Miralax daily for constipation.     GIOVANI Delong [facility]

## 2018-01-04 NOTE — LETTER
January 4, 2018      Deneen Hyde  [address] [place] [address]        Dear ,    We are writing to inform you of your test results.    Your test results fall within the expected range(s) or remain unchanged from previous results.     all labs are normal     Please continue with current treatment plan.    Resulted Orders   UA reflex to Microscopic   Result Value Ref Range    Color Urine Yellow     Appearance Urine Clear     Glucose Urine Negative NEG^Negative mg/dL    Bilirubin Urine Negative NEG^Negative    Ketones Urine Negative NEG^Negative mg/dL    Specific Gravity Urine <=1.005 1.003 - 1.035    Blood Urine Negative NEG^Negative    pH Urine 6.0 5.0 - 7.0 pH    Protein Albumin Urine Negative NEG^Negative mg/dL    Urobilinogen Urine 0.2 0.2 - 1.0 EU/dL    Nitrite Urine Negative NEG^Negative    Leukocyte Esterase Urine Negative NEG^Negative    Source Midstream Urine        If you have any questions or concerns, please call the clinic at the number listed above.       Sincerely,        GIOVANI Delong CNP

## 2018-01-04 NOTE — MR AVS SNAPSHOT
After Visit Summary   1/4/2018    Deneen Hyde    MRN: [id number]           Patient Information     Date Of Birth          1970        Visit Information        Provider Department      1/4/2018 8:40 AM Leann Cisneros APRN CNP [facility]        Today's Diagnoses     Abdominal pain, left lower quadrant    -  1    Chronic left-sided low back pain without sciatica          Care Instructions    1. Schedule CT scan      Thank you for choosing [facility].  You may be receiving a survey in the mail from Rohan Jaimes regarding your visit today.  Please take a few minutes to complete and return the survey to let us know how we are doing.      If you have questions or concerns, please contact us via [employer] or you can contact your care team at [phone].    Our Clinic hours are:  Monday 6:40 am  to 7:00 pm  Tuesday -Friday 6:40 am to 5:00 pm    The [place] outpatient lab hours are:  Monday - Friday 6:10 am to 4:45 pm  Saturdays 7:00 am to 11:00 am  Appointments are required, call [phone]    If you have clinical questions after hours or would like to schedule an appointment,  call the clinic at [phone].            Follow-ups after your visit        Future tests that were ordered for you today     Open Future Orders        Priority Expected Expires Ordered    CT Abdomen Pelvis w Contrast Routine  1/4/2019 1/4/2018            Who to contact     If you have questions or need follow up information about today's clinic visit or your schedule please contact [facility] directly at [phone].  Normal or non-critical lab and imaging results will be communicated to you by MyChart, letter or phone within 4 business days after the clinic has received the results. If you do not hear from us within 7 days, please contact the clinic through [email or web address] or phone. If you have a critical or abnormal lab result, we will notify you by phone as soon as possible.  Submit refill  "requests through [employer] or call your pharmacy and they will forward the refill request to us. Please allow 3 business days for your refill to be completed.          Additional Information About Your Visit        Contract [employer] Information     [employer] lets you send messages to your doctor, view your test results, renew your prescriptions, schedule appointments and more. To sign up, go to [email or web address] . Click on \"Log in\" on the left side of the screen, which will take you to the Welcome page. Then click on \"Sign up Now\" on the right side of the page.     You will be asked to enter the access code listed below, as well as some personal information. Please follow the directions to create your username and password.     Your access code is: [email or web address]  Expires: 2018  2:14 PM     Your access code will  in 90 days. If you need help or a new code, please call your [place] clinic or [phone].        Care EveryWhere ID     This is your Care EveryWhere ID. This could be used by other organizations to access your [place] medical records  [id number]        Your Vitals Were     Pulse Temperature Respirations Height Pulse Oximetry BMI (Body Mass Index)    75 98.3  F (36.8  C) (Tympanic) 16 5' 2\" (1.575 m) 98% 24.36 kg/m2       Blood Pressure from Last 3 Encounters:   18 94/62   17 92/68   17 101/61    Weight from Last 3 Encounters:   18 133 lb 3.2 oz (60.4 kg)   17 131 lb 12.8 oz (59.8 kg)   17 133 lb 3.2 oz (60.4 kg)               Primary Care Provider Office Phone # Fax #    Leann ValverdeGIOVANI [facility] [phone] [address] [facility] [address]        Equal Access to Services     BOAZ HERNANDEZ : Guru Robbins, waaxda luqamy, qaybkevin cortezaltony pearson. So [facility] [phone].    ATENCIÓN: Si habla español, tiene a valverde disposición servicios gratuitos de asistencia lingüística. Llame al " [phone].    We comply with applicable federal civil rights laws and [place] laws. We do not discriminate on the basis of race, color, national origin, age, disability, sex, sexual orientation, or gender identity.            Thank you!     Thank you for choosing [facility]  for your care. Our goal is always to provide you with excellent care. Hearing back from our patients is one way we can continue to improve our services. Please take a few minutes to complete the written survey that you may receive in the mail after your visit with us. Thank you!             Your Updated Medication List - Protect others around you: Learn how to safely use, store and throw away your medicines at www.disposemymeds.org.          This list is accurate as of: 1/4/18  9:23 AM.  Always use your most recent med list.                   Brand Name Dispense Instructions for use Diagnosis    acetaminophen 500 MG tablet    TYLENOL     Take 1,000 mg by mouth every 6 hours as needed for mild pain        CALCIUM PO      1 tablet daily when she remembers , unknown dose        ibuprofen 200 MG tablet    ADVIL/MOTRIN     Take 600 mg by mouth every 6 hours as needed for mild pain        MULTIVITAMIN PO

## 2018-01-04 NOTE — NURSING NOTE
"Chief Complaint   Patient presents with     Abdominal Pain       Initial BP 94/62 (BP Location: Left arm, Patient Position: Chair, Cuff Size: Adult Regular)  Pulse 75  Temp 98.3  F (36.8  C) (Tympanic)  Resp 16  Ht 5' 2\" (1.575 m)  Wt 133 lb 3.2 oz (60.4 kg)  SpO2 98%  BMI 24.36 kg/m2 Estimated body mass index is 24.36 kg/(m^2) as calculated from the following:    Height as of this encounter: 5' 2\" (1.575 m).    Weight as of this encounter: 133 lb 3.2 oz (60.4 kg).  Medication Reconciliation: complete  "

## 2018-01-04 NOTE — PATIENT INSTRUCTIONS
1. Schedule CT scan      Thank you for choosing [facility].  You may be receiving a survey in the mail from [facility] regarding your visit today.  Please take a few minutes to complete and return the survey to let us know how we are doing.      If you have questions or concerns, please contact us via [employer] or you can contact your care team at [phone].    Our Clinic hours are:  Monday 6:40 am  to 7:00 pm  Tuesday -Friday 6:40 am to 5:00 pm    The [place] outpatient lab hours are:  Monday - Friday 6:10 am to 4:45 pm  Saturdays 7:00 am to 11:00 am  Appointments are required, call [phone]    If you have clinical questions after hours or would like to schedule an appointment,  call the clinic at [phone].

## 2018-03-14 ENCOUNTER — OFFICE VISIT (OUTPATIENT)
Dept: FAMILY MEDICINE | Facility: CLINIC | Age: 48
End: 2018-03-14
Payer: COMMERCIAL

## 2018-03-14 VITALS
BODY MASS INDEX: 24.25 KG/M2 | WEIGHT: 132.6 LBS | TEMPERATURE: 96.5 F | DIASTOLIC BLOOD PRESSURE: 67 MMHG | SYSTOLIC BLOOD PRESSURE: 100 MMHG | HEART RATE: 72 BPM

## 2018-03-14 DIAGNOSIS — N63.10 LUMP OF RIGHT BREAST: Primary | ICD-10-CM

## 2018-03-14 PROCEDURE — 99213 OFFICE O/P EST LOW 20 MIN: CPT | Performed by: NURSE PRACTITIONER

## 2018-03-14 NOTE — PATIENT INSTRUCTIONS
1. Call to schedule your mammogram          Thank you for choosing [facility].  You may be receiving a survey in the mail from [facility] regarding your visit today.  Please take a few minutes to complete and return the survey to let us know how we are doing.      If you have questions or concerns, please contact us via [employer] or you can contact your care team at [phone].    Our Clinic hours are:  Monday 6:40 am  to 7:00 pm  Tuesday -Friday 6:40 am to 5:00 pm    The [place] outpatient lab hours are:  Monday - Friday 6:10 am to 4:45 pm  Saturdays 7:00 am to 11:00 am  Appointments are required, call [phone]    If you have clinical questions after hours or would like to schedule an appointment,  call the clinic at [phone].

## 2018-03-14 NOTE — MR AVS SNAPSHOT
After Visit Summary   3/14/2018    Deneen Hyde    MRN: [id number]           Patient Information     Date Of Birth          1970        Visit Information        Provider Department      3/14/2018 7:00 AM Leann Cisneros APRN CNP [facility]        Today's Diagnoses     Lump of right breast    -  1      Care Instructions    1. Call to schedule your mammogram          Thank you for choosing [facility].  You may be receiving a survey in the mail from [facility] regarding your visit today.  Please take a few minutes to complete and return the survey to let us know how we are doing.      If you have questions or concerns, please contact us via [employer] or you can contact your care team at [phone].    Our Clinic hours are:  Monday 6:40 am  to 7:00 pm  Tuesday -Friday 6:40 am to 5:00 pm    The [place] outpatient lab hours are:  Monday - Friday 6:10 am to 4:45 pm  Saturdays 7:00 am to 11:00 am  Appointments are required, call [phone]    If you have clinical questions after hours or would like to schedule an appointment,  call the clinic at [phone].          Follow-ups after your visit        Your next 10 appointments already scheduled     Apr 06, 2018  7:30 AM CDT   New Visit with Gadiel Roblero MD   [facility] ([facility])    [address] [facility] [address] [phone]              Future tests that were ordered for you today     Open Future Orders        Priority Expected Expires Ordered    MA Diagnostic Digital Bilateral Routine  3/14/2019 3/14/2018    US Breast Right Complete 4 Quadrants Routine  3/14/2019 3/14/2018            Who to contact     If you have questions or need follow up information about today's clinic visit or your schedule please contact [facility] directly at [phone].  Normal or non-critical lab and imaging results will be communicated to you by MyChart, letter or phone within 4  "business days after the clinic has received the results. If you do not hear from us within 7 days, please contact the clinic through [employer] or phone. If you have a critical or abnormal lab result, we will notify you by phone as soon as possible.  Submit refill requests through [employer] or call your pharmacy and they will forward the refill request to us. Please allow 3 business days for your refill to be completed.          Additional Information About Your Visit        [employer] Information     [employer] lets you send messages to your doctor, view your test results, renew your prescriptions, schedule appointments and more. To sign up, go to [email or web address] . Click on \"Log in\" on the left side of the screen, which will take you to the Welcome page. Then click on \"Sign up Now\" on the right side of the page.     You will be asked to enter the access code listed below, as well as some personal information. Please follow the directions to create your username and password.     Your access code is: [email or web address]  Expires: 2018  7:33 AM     Your access code will  in 90 days. If you need help or a new code, please call your [place] clinic or [phone].        Care EveryWhere ID     This is your Care EveryWhere ID. This could be used by other organizations to access your [place] medical records  [id number]        Your Vitals Were     Pulse Temperature BMI (Body Mass Index)             72 96.5  F (35.8  C) (Tympanic) 24.25 kg/m2          Blood Pressure from Last 3 Encounters:   18 100/67   18 94/62   17 92/68    Weight from Last 3 Encounters:   18 132 lb 9.6 oz (60.1 kg)   18 133 lb 3.2 oz (60.4 kg)   17 131 lb 12.8 oz (59.8 kg)               Primary Care Provider Office Phone # Fax #    Leann Rojasradu APRKARLEE GLEASON [phone] [address] [facility] [address]        Equal Access to Services     BOAZ HERNANDEZ AH: jeronimo Chirinos " maggie alvaradomadaniela durantjessicazain suarezwestley ozielin hayaan adeeg kharash la'aan ah. So [facility] [phone].    ATENCIÓN: Si cristiane gomez, tiene a mohamud disposición servicios gratuitos de asistencia lingüística. Giuliana al [phone].    We comply with applicable federal civil rights laws and [place] laws. We do not discriminate on the basis of race, color, national origin, age, disability, sex, sexual orientation, or gender identity.            Thank you!     Thank you for choosing [facility]  for your care. Our goal is always to provide you with excellent care. Hearing back from our patients is one way we can continue to improve our services. Please take a few minutes to complete the written survey that you may receive in the mail after your visit with us. Thank you!             Your Updated Medication List - Protect others around you: Learn how to safely use, store and throw away your medicines at www.disposemymeds.org.          This list is accurate as of 3/14/18  7:33 AM.  Always use your most recent med list.                   Brand Name Dispense Instructions for use Diagnosis    acetaminophen 500 MG tablet    TYLENOL     Take 1,000 mg by mouth every 6 hours as needed for mild pain        CALCIUM PO      1 tablet daily when she remembers , unknown dose        ibuprofen 200 MG tablet    ADVIL/MOTRIN     Take 600 mg by mouth every 6 hours as needed for mild pain        MULTIVITAMIN PO

## 2018-03-14 NOTE — PROGRESS NOTES
SUBJECTIVE:   Deneen Hyde is a 47 year old female who presents to clinic today for the following health issues:    Chief Complaint   Patient presents with     Breast Problem     right breast lump, noticed a few months ago     Patient previously has mammogram at outside clinic in 2014- reviewed via care everywhere- showed clustered calcifications in the left breast however normal appearing breast of the right breast.   Patient drinks a lot of caffeine-   Noted lump a few months ago at 4 O'clock position- feels like it is smaller in size.     -------------------------------------    Problem list and histories reviewed & adjusted, as indicated.  Additional history: as documented    There is no problem list on file for this patient.    History reviewed. No pertinent surgical history.    Social History   Substance Use Topics     Smoking status: Former Smoker     Quit date: 11/30/2007     Smokeless tobacco: Never Used     Alcohol use No     Family History   Problem Relation Age of Onset     Thyroid Disease Mother      Suicide Father      Suicide Sister      Thyroid Disease Sister      Depression Daughter            Reviewed and updated as needed this visit by clinical staff  Tobacco  Allergies  Med Hx  Surg Hx  Fam Hx  Soc Hx      Reviewed and updated as needed this visit by Provider         ROS:  Constitutional, HEENT, cardiovascular, pulmonary, GI, , musculoskeletal, neuro, skin, endocrine and psych systems are negative, except as otherwise noted.    OBJECTIVE:     /67 (BP Location: Left arm, Patient Position: Sitting, Cuff Size: Adult Regular)  Pulse 72  Temp 96.5  F (35.8  C) (Tympanic)  Wt 132 lb 9.6 oz (60.1 kg)  BMI 24.25 kg/m2  Body mass index is 24.25 kg/(m^2).  GENERAL: healthy, alert and no distress  BREAST: normal without masses, tenderness or nipple discharge and no palpable axillary masses or adenopathy  MS: no gross musculoskeletal defects noted, no edema    Diagnostic Test  Results:  none     ASSESSMENT/PLAN:         1. Lump of right breast  - patient feeling right breast lump at 4 O'clock position however I do not feel any lump at the site- recommend updating mammogram with ultrasound if needed- last mammogram in 2014   - MA Diagnostic Digital Bilateral; Future  - US Breast Right Complete 4 Quadrants; Future  - discussed decreasing caffeine amount as this can increase breast pain and fibrocystic breast tissue   - follow up pending results     GIOVANI Delong CNP  [facility]

## 2018-03-14 NOTE — NURSING NOTE
"Chief Complaint   Patient presents with     Breast Problem     right breast lump, noticed a few months ago       Initial /67 (BP Location: Left arm, Patient Position: Sitting, Cuff Size: Adult Regular)  Pulse 72  Temp 96.5  F (35.8  C) (Tympanic)  Wt 132 lb 9.6 oz (60.1 kg)  BMI 24.25 kg/m2 Estimated body mass index is 24.25 kg/(m^2) as calculated from the following:    Height as of 1/4/18: 5' 2\" (1.575 m).    Weight as of this encounter: 132 lb 9.6 oz (60.1 kg).  Medication Reconciliation: complete    "

## 2018-04-06 ENCOUNTER — TELEPHONE (OUTPATIENT)
Dept: SURGERY | Facility: CLINIC | Age: 48
End: 2018-04-06

## 2018-04-06 ENCOUNTER — OFFICE VISIT (OUTPATIENT)
Dept: SURGERY | Facility: CLINIC | Age: 48
End: 2018-04-06
Payer: COMMERCIAL

## 2018-04-06 ENCOUNTER — HOSPITAL ENCOUNTER (OUTPATIENT)
Dept: MAMMOGRAPHY | Facility: CLINIC | Age: 48
Discharge: HOME | End: 2018-04-06
Attending: NURSE PRACTITIONER | Admitting: NURSE PRACTITIONER
Payer: COMMERCIAL

## 2018-04-06 VITALS — DIASTOLIC BLOOD PRESSURE: 64 MMHG | HEART RATE: 63 BPM | SYSTOLIC BLOOD PRESSURE: 91 MMHG | RESPIRATION RATE: 16 BRPM

## 2018-04-06 DIAGNOSIS — Z01.818 PRE-OP EXAMINATION: Primary | ICD-10-CM

## 2018-04-06 DIAGNOSIS — N63.12 UNSPECIFIED LUMP IN THE RIGHT BREAST, UPPER INNER QUADRANT: ICD-10-CM

## 2018-04-06 DIAGNOSIS — K40.20 NON-RECURRENT BILATERAL INGUINAL HERNIA WITHOUT OBSTRUCTION OR GANGRENE: ICD-10-CM

## 2018-04-06 PROCEDURE — 77066 DX MAMMO INCL CAD BI: CPT

## 2018-04-06 PROCEDURE — 99244 OFF/OP CNSLTJ NEW/EST MOD 40: CPT | Performed by: SURGERY

## 2018-04-06 NOTE — TELEPHONE ENCOUNTER
Patient did not present to lab for testing today (CBCD, CBASIC).  The orders have been cancelled as NO SHOW and reordered as FUTURE. Please notify patient to return to lab for testing.    Thank  You for your attention to this matter.    OP Lab Staff

## 2018-04-06 NOTE — NURSING NOTE
"Chief Complaint   Patient presents with     Consult     Hernia       Initial BP 91/64  Pulse 63  Resp 16 Estimated body mass index is 24.25 kg/(m^2) as calculated from the following:    Height as of 1/4/18: 5' 2\" (1.575 m).    Weight as of 3/14/18: 132 lb 9.6 oz (60.1 kg).  Medication Reconciliation: complete    "

## 2018-04-06 NOTE — LETTER
4/6/2018         RE: Deneen Hyde  [address] [place] [address]        Dear Colleague,    Thank you for referring your patient, Deneen Hyde, to the [facility]. Please see a copy of my visit note below.    Patient referred by Dr. Cisneros for evaluation of a right groin mass    47-year-old female complaining of right groin mass and pain for the past several months. Patient does not remember any inciting event reports an obvious bulge in her right groin. She is very active and has Beth approaches she wants to start exercising more and this is starting to interfere with her activities of daily living. Pain is localized 1 out of 10 without radiation. Aggravated by straining and alleviated by rest. The mass is reducible. She denies fevers chills nausea and vomiting.    There is no problem list on file for this patient.      History reviewed. No pertinent past medical history.    History reviewed. No pertinent surgical history.    Family History   Problem Relation Age of Onset     Thyroid Disease Mother      Suicide Father      Suicide Sister      Thyroid Disease Sister      Depression Daughter        Social History   Substance Use Topics     Smoking status: Former Smoker     Quit date: 11/30/2007     Smokeless tobacco: Never Used     Alcohol use No        History   Drug Use No       Current Outpatient Prescriptions   Medication Sig Dispense Refill     Multiple Vitamins-Minerals (MULTIVITAMIN PO)        CALCIUM PO 1 tablet daily when she remembers , unknown dose       ibuprofen (ADVIL/MOTRIN) 200 MG tablet Take 600 mg by mouth every 6 hours as needed for mild pain       acetaminophen (TYLENOL) 500 MG tablet Take 1,000 mg by mouth every 6 hours as needed for mild pain         Allergies   Allergen Reactions     Orange Fruit [Citrus] Anaphylaxis     Tomatoes [Tomato] Anaphylaxis     Sulfa Drugs Swelling     PN: eyes swelled     Trimethoprim      PN: eyes swelling / itching        ROS  Constitutional - Denies fevers, weight loss, malaise, lethargy  Neuro - Denies tremors or seizures  Pulmon - Denies SOB, dyspnea, hemoptysis, chronic cough or use of an inhaler  CV - Denies CP, SOB, lower extremity edema, difficulty w/ stairs, has never used NTG  GI - Denies hematemesis, BRBPR, melena, chronic diarrhea or epigastric pain   - Denies hematuria, difficulty voiding, h/o STDs  Hematology - Denies blood clotting disorders, chronic anemias  Dermatology - No melanomas or skin cancers  Rheumatology - No h/o RA  Pysch - Denies depression, bipolar d/o or schizophrenia    Exam:  Patient Vitals for the past 24 hrs:   BP Pulse Resp   04/06/18 0741 91/64 63 16       General - Alert and Oriented X4, NAD, well nourished  HEENT - Normocephalic, atraumatic, PERRLA, Nose midline, Throat without lesions  Neck - supple, no LAD, Thyroid normal, Carotids without bruits  Lungs - Clear to auscultation bilaterally with good inspiratory effort, no tactile fremitus  CV - Heart RRR, no lift's, thrills, murmurs, rubs, or gallops. Carotid, radial, and femoral pulses 2+ bilaterally  Abdomen - Soft, non-tender, +BS, no hepatosplenomegaly, no palpable masses  Groins - 2+ pulses bilaterally and no LAD, palpable reducible mass on right, palpable strong impulse on the left  Neuro - Full ROM, Strength 5/5 and major muscle groups, sensation intact  Extremities - No cyanosis, clubbing or edema    Assessment and plan: 47-year-old female with reducible bilateral inguinal hernias. Patient has good candidate for laparoscopic repair. Risks and benefits alternatives and complications were discussed with the patient including the possibility of infection bleeding or hernia recurrence. Patient understood and wished to proceed. PATIENT IS CLEARED FOR SURGERY.    Gadiel Roblero MD     Again, thank you for allowing me to participate in the care of your patient.        Sincerely,        Gadiel Roblero MD

## 2018-04-06 NOTE — PROGRESS NOTES
Patient referred by Dr. Cisneros for evaluation of a right groin mass    47-year-old female complaining of right groin mass and pain for the past several months. Patient does not remember any inciting event reports an obvious bulge in her right groin. She is very active and has Beth approaches she wants to start exercising more and this is starting to interfere with her activities of daily living. Pain is localized 1 out of 10 without radiation. Aggravated by straining and alleviated by rest. The mass is reducible. She denies fevers chills nausea and vomiting.    There is no problem list on file for this patient.      History reviewed. No pertinent past medical history.    History reviewed. No pertinent surgical history.    Family History   Problem Relation Age of Onset     Thyroid Disease Mother      Suicide Father      Suicide Sister      Thyroid Disease Sister      Depression Daughter        Social History   Substance Use Topics     Smoking status: Former Smoker     Quit date: 11/30/2007     Smokeless tobacco: Never Used     Alcohol use No        History   Drug Use No       Current Outpatient Prescriptions   Medication Sig Dispense Refill     Multiple Vitamins-Minerals (MULTIVITAMIN PO)        CALCIUM PO 1 tablet daily when she remembers , unknown dose       ibuprofen (ADVIL/MOTRIN) 200 MG tablet Take 600 mg by mouth every 6 hours as needed for mild pain       acetaminophen (TYLENOL) 500 MG tablet Take 1,000 mg by mouth every 6 hours as needed for mild pain         Allergies   Allergen Reactions     Orange Fruit [Citrus] Anaphylaxis     Tomatoes [Tomato] Anaphylaxis     Sulfa Drugs Swelling     PN: eyes swelled     Trimethoprim      PN: eyes swelling / itching       ROS  Constitutional - Denies fevers, weight loss, malaise, lethargy  Neuro - Denies tremors or seizures  Pulmon - Denies SOB, dyspnea, hemoptysis, chronic cough or use of an inhaler  CV - Denies CP, SOB, lower extremity edema, difficulty w/ stairs, has  never used NTG  GI - Denies hematemesis, BRBPR, melena, chronic diarrhea or epigastric pain   - Denies hematuria, difficulty voiding, h/o STDs  Hematology - Denies blood clotting disorders, chronic anemias  Dermatology - No melanomas or skin cancers  Rheumatology - No h/o RA  Pysch - Denies depression, bipolar d/o or schizophrenia    Exam:  Patient Vitals for the past 24 hrs:   BP Pulse Resp   04/06/18 0741 91/64 63 16       General - Alert and Oriented X4, NAD, well nourished  HEENT - Normocephalic, atraumatic, PERRLA, Nose midline, Throat without lesions  Neck - supple, no LAD, Thyroid normal, Carotids without bruits  Lungs - Clear to auscultation bilaterally with good inspiratory effort, no tactile fremitus  CV - Heart RRR, no lift's, thrills, murmurs, rubs, or gallops. Carotid, radial, and femoral pulses 2+ bilaterally  Abdomen - Soft, non-tender, +BS, no hepatosplenomegaly, no palpable masses  Groins - 2+ pulses bilaterally and no LAD, palpable reducible mass on right, palpable strong impulse on the left  Neuro - Full ROM, Strength 5/5 and major muscle groups, sensation intact  Extremities - No cyanosis, clubbing or edema    Assessment and plan: 47-year-old female with reducible bilateral inguinal hernias. Patient has good candidate for laparoscopic repair. Risks and benefits alternatives and complications were discussed with the patient including the possibility of infection bleeding or hernia recurrence. Patient understood and wished to proceed. PATIENT IS CLEARED FOR SURGERY.    Gadiel Roblero MD

## 2018-04-06 NOTE — MR AVS SNAPSHOT
"              After Visit Summary   4/6/2018    Deneen Hyde    MRN: [id number]           Patient Information     Date Of Birth          1970        Visit Information        Provider Department      4/6/2018 7:30 AM Gadiel Roblero MD [facility]        Today's Diagnoses     Pre-op examination    -  1    Non-recurrent bilateral inguinal hernia without obstruction or gangrene           Follow-ups after your visit        Your next 10 appointments already scheduled     Apr 06, 2018  9:00 AM CDT   (Arrive by 8:45 AM)   MA DIAGNOSTIC DIGITAL BILATERAL with [id number]   [facility] ([facility])    [address] [address]   [phone]           Do not use any powder, lotion or deodorant under your arms or on your breast. If you do, we will ask you to remove it before your exam.  Wear comfortable, two-piece clothing.  If you have any allergies, tell your care team.  Bring any previous mammograms from other facilities or have them mailed to the breast center.  Three-dimensional (3D) mammograms are available at [place] locations in [facility], [facility], [facility], and [place]. [facility] locations include [place] [facility] [place] in [place]. Benefits of 3D mammograms include: - Improved rate of cancer detection - Decreases your chance of having to go back for more tests, which means fewer: - \"False-positive\" results (This means that there is an abnormal area but it isn't cancer.) - Invasive testing procedures, such as a biopsy or surgery - Can provide clearer images of the breast if you have dense breast tissue. 3D mammography is an optional exam that anyone can have with a 2D mammogram. It doesn't replace or take the place of a 2D mammogram. 2D mammograms remain an effective screening test for all women.  Not all insurance companies cover the cost of a 3D mammogram. Check with your insurance.        " "    Apr 06, 2018  9:50 AM CDT   US BREAST RIGHT CMPL 4 QUAD with [id number]   [facility] [place] [facility] ([facility])    [address] [facility] [address] [phone]           Please bring a list of your medicines (including vitamins, minerals and over-the-counter drugs). Also, tell your doctor about any allergies you may have. Wear comfortable clothes and leave your valuables at home.  You do not need to do anything special to prepare for your exam.  Please call the Imaging Department at your exam site with any questions.              Future tests that were ordered for you today     Open Future Orders        Priority Expected Expires Ordered    MA Diagnostic Bilateral w/Amador Routine  4/5/2019 3/14/2018            Who to contact     If you have questions or need follow up information about today's clinic visit or your schedule please contact [facility] directly at [phone].  Normal or non-critical lab and imaging results will be communicated to you by MyChart, letter or phone within 4 business days after the clinic has received the results. If you do not hear from us within 7 days, please contact the clinic through [employer] or phone. If you have a critical or abnormal lab result, we will notify you by phone as soon as possible.  Submit refill requests through [employer] or call your pharmacy and they will forward the refill request to us. Please allow 3 business days for your refill to be completed.          Additional Information About Your Visit        [employer] Information     [employer] lets you send messages to your doctor, view your test results, renew your prescriptions, schedule appointments and more. To sign up, go to [email or web address] . Click on \"Log in\" on the left side of the screen, which will take you to the Welcome page. Then click on \"Sign up Now\" on the right side of the page.     You will be asked to enter the access code listed below, as well as some " personal information. Please follow the directions to create your username and password.     Your access code is: [email or web address]  Expires: 2018  7:33 AM     Your access code will  in 90 days. If you need help or a new code, please call your [place] clinic or [phone].        Care EveryWhere ID     This is your Care EveryWhere ID. This could be used by other organizations to access your [place] medical records  [id number]        Your Vitals Were     Pulse Respirations                63 16           Blood Pressure from Last 3 Encounters:   18 91/64   18 100/67   18 94/62    Weight from Last 3 Encounters:   18 132 lb 9.6 oz (60.1 kg)   18 133 lb 3.2 oz (60.4 kg)   17 131 lb 12.8 oz (59.8 kg)              We Performed the Following     Basic metabolic panel  (Ca, Cl, CO2, Creat, Gluc, K, Na, BUN)     CBC with platelets and differential     Lucy-Operative Worksheet        Primary Care Provider Office Phone # Fax #    Leann Rojast, APRN [facility] [phone] [address] [facility] [address]        Equal Access to Services     BOAZ HERNANDEZ AH: Hadii akira ku hadasho Soomaali, waaxda luqadaha, qaybta kaalmada adeegyada, waxay marcelo solano. So [facility] [phone].    ATENCIÓN: Si habla español, tiene a mohamud disposición servicios gratuitos de asistencia lingüística. Llame al [phone].    We comply with applicable federal civil rights laws and [place] laws. We do not discriminate on the basis of race, color, national origin, age, disability, sex, sexual orientation, or gender identity.            Thank you!     Thank you for choosing [facility]  for your care. Our goal is always to provide you with excellent care. Hearing back from our patients is one way we can continue to improve our services. Please take a few minutes to complete the written survey that you may receive in the mail after your visit with us. Thank  you!             Your Updated Medication List - Protect others around you: Learn how to safely use, store and throw away your medicines at www.disposemymeds.org.          This list is accurate as of 4/6/18  8:04 AM.  Always use your most recent med list.                   Brand Name Dispense Instructions for use Diagnosis    acetaminophen 500 MG tablet    TYLENOL     Take 1,000 mg by mouth every 6 hours as needed for mild pain        CALCIUM PO      1 tablet daily when she remembers , unknown dose        ibuprofen 200 MG tablet    ADVIL/MOTRIN     Take 600 mg by mouth every 6 hours as needed for mild pain        MULTIVITAMIN PO

## 2018-04-11 ENCOUNTER — TELEPHONE (OUTPATIENT)
Dept: SURGERY | Facility: CLINIC | Age: 48
End: 2018-04-11

## 2018-05-22 ENCOUNTER — OFFICE VISIT (OUTPATIENT)
Dept: FAMILY MEDICINE | Facility: CLINIC | Age: 48
End: 2018-05-22
Payer: COMMERCIAL

## 2018-05-22 VITALS
OXYGEN SATURATION: 98 % | WEIGHT: 130 LBS | SYSTOLIC BLOOD PRESSURE: 106 MMHG | DIASTOLIC BLOOD PRESSURE: 62 MMHG | HEART RATE: 61 BPM | TEMPERATURE: 97.1 F | BODY MASS INDEX: 23.78 KG/M2

## 2018-05-22 DIAGNOSIS — Z01.818 PREOP GENERAL PHYSICAL EXAM: Primary | ICD-10-CM

## 2018-05-22 DIAGNOSIS — K40.20 BILATERAL INGUINAL HERNIA WITHOUT OBSTRUCTION OR GANGRENE, RECURRENCE NOT SPECIFIED: ICD-10-CM

## 2018-05-22 PROCEDURE — 99214 OFFICE O/P EST MOD 30 MIN: CPT | Performed by: NURSE PRACTITIONER

## 2018-05-22 NOTE — NURSING NOTE
"Initial /62 (BP Location: Left arm, Patient Position: Chair, Cuff Size: Adult Regular)  Pulse 61  Temp 97.1  F (36.2  C) (Tympanic)  Wt 130 lb (59 kg)  SpO2 98%  BMI 23.78 kg/m2 Estimated body mass index is 23.78 kg/(m^2) as calculated from the following:    Height as of 1/4/18: 5' 2\" (1.575 m).    Weight as of this encounter: 130 lb (59 kg). .    Mikayla Rivas    "

## 2018-05-22 NOTE — MR AVS SNAPSHOT
After Visit Summary   5/22/2018    Deneen Hyde    MRN: [id number]           Patient Information     Date Of Birth          1970        Visit Information        Provider Department      5/22/2018 10:20 AM Leann Cisneros APRN CNP [facility]        Today's Diagnoses     Preop general physical exam    -  1    Bilateral inguinal hernia without obstruction or gangrene, recurrence not specified          Care Instructions      Before Your Surgery  No aspirin or Ibuprofen 3-4 days before surgery       Call your surgeon if there is any change in your health. This includes signs of a cold or flu (such as a sore throat, runny nose, cough, rash or fever).    Do not smoke, drink alcohol or take over the counter medicine (unless your surgeon or primary care doctor tells you to) for the 24 hours before and after surgery.    If you take prescribed drugs: Follow your doctor s orders about which medicines to take and which to stop until after surgery.    Eating and drinking prior to surgery: follow the instructions from your surgeon    Take a shower or bath the night before surgery. Use the soap your surgeon gave you to gently clean your skin. If you do not have soap from your surgeon, use your regular soap. Do not shave or scrub the surgery site.  Wear clean pajamas and have clean sheets on your bed.           Follow-ups after your visit        Your next 10 appointments already scheduled     Jun 12, 2018   Procedure with Gadiel Roblero MD   [facility] (--)    [address] [address] [phone]           The medical center is located at [address] (between [address] in [place], [place] north of [place]).              Who to contact     If you have questions or need follow up information about today's clinic visit or your schedule please contact [facility] directly at [phone].  Normal or non-critical lab and  "imaging results will be communicated to you by MyChart, letter or phone within 4 business days after the clinic has received the results. If you do not hear from us within 7 days, please contact the clinic through nodilat or phone. If you have a critical or abnormal lab result, we will notify you by phone as soon as possible.  Submit refill requests through [employer] or call your pharmacy and they will forward the refill request to us. Please allow 3 business days for your refill to be completed.          Additional Information About Your Visit        [employer] Information     [employer] lets you send messages to your doctor, view your test results, renew your prescriptions, schedule appointments and more. To sign up, go to [email or web address] . Click on \"Log in\" on the left side of the screen, which will take you to the Welcome page. Then click on \"Sign up Now\" on the right side of the page.     You will be asked to enter the access code listed below, as well as some personal information. Please follow the directions to create your username and password.     Your access code is: [email or web address]  Expires: 2018  7:33 AM     Your access code will  in 90 days. If you need help or a new code, please call your [facility] clinic or [phone].        Care EveryWhere ID     This is your Care EveryWhere ID. This could be used by other organizations to access your [facility] medical records  [id number]        Your Vitals Were     Pulse Temperature Pulse Oximetry BMI (Body Mass Index)          61 97.1  F (36.2  C) (Tympanic) 98% 23.78 kg/m2         Blood Pressure from Last 3 Encounters:   18 106/62   18 91/64   18 100/67    Weight from Last 3 Encounters:   18 130 lb (59 kg)   18 132 lb 9.6 oz (60.1 kg)   18 133 lb 3.2 oz (60.4 kg)              Today, you had the following     No orders found for display       Primary Care Provider Office Phone # Fax #    Leann GIOVANI Vigil CNP " [phone]       [address] [address]        Equal Access to Services     BOAZ HERNANDEZ : Hadii aad ku hadfelisaloli Nikkizelalem, washantalda cheyennecharissaha, maggie kacarlosda manda, tony leon corbinmorena arias laEverand solano. So [facility] [phone].    ATENCIÓN: Si habla español, tiene a mohamud disposición servicios gratuitos de asistencia lingüística. Llame al [phone].    We comply with applicable federal civil rights laws and [place] laws. We do not discriminate on the basis of race, color, national origin, age, disability, sex, sexual orientation, or gender identity.            Thank you!     Thank you for choosing [facility]  for your care. Our goal is always to provide you with excellent care. Hearing back from our patients is one way we can continue to improve our services. Please take a few minutes to complete the written survey that you may receive in the mail after your visit with us. Thank you!             Your Updated Medication List - Protect others around you: Learn how to safely use, store and throw away your medicines at www.disposemymeds.org.          This list is accurate as of 5/22/18 10:51 AM.  Always use your most recent med list.                   Brand Name Dispense Instructions for use Diagnosis    acetaminophen 500 MG tablet    TYLENOL     Take 1,000 mg by mouth every 6 hours as needed for mild pain        CALCIUM PO      1 tablet daily when she remembers , unknown dose        ibuprofen 200 MG tablet    ADVIL/MOTRIN     Take 600 mg by mouth every 6 hours as needed for mild pain        MULTIVITAMIN PO           PRO-BIOTIC BLEND PO           vitamin b complex w/vitamin C Tabs tablet      Take 1 tablet by mouth daily

## 2018-05-22 NOTE — PATIENT INSTRUCTIONS
Before Your Surgery  No aspirin or Ibuprofen 3-4 days before surgery       Call your surgeon if there is any change in your health. This includes signs of a cold or flu (such as a sore throat, runny nose, cough, rash or fever).    Do not smoke, drink alcohol or take over the counter medicine (unless your surgeon or primary care doctor tells you to) for the 24 hours before and after surgery.    If you take prescribed drugs: Follow your doctor s orders about which medicines to take and which to stop until after surgery.    Eating and drinking prior to surgery: follow the instructions from your surgeon    Take a shower or bath the night before surgery. Use the soap your surgeon gave you to gently clean your skin. If you do not have soap from your surgeon, use your regular soap. Do not shave or scrub the surgery site.  Wear clean pajamas and have clean sheets on your bed.

## 2018-05-22 NOTE — PROGRESS NOTES
[facility]  [address] [address]  [phone]  Dept: [phone]    PRE-OP EVALUATION:  Today's date: 2018    Deneen Hyde (: 1970) presents for pre-operative evaluation assessment as requested by Dr. Roblreo.  She requires evaluation and anesthesia risk assessment prior to undergoing surgery/procedure for treatment of bilateral inguinal hernias .    Proposed Surgery/ Procedure: Lap bilateral Inguinal hernia repair  Date of Surgery/ Procedure: 18  Time of Surgery/ Procedure: to be determined  Hospital/Surgical Facility:[facility]    Primary Physician: Leann Cisneros  Type of Anesthesia Anticipated: General    Patient has a Health Care Directive or Living Will:  NO    1. NO - Do you have a history of heart attack, stroke, stent, bypass or surgery on an artery in the head, neck, heart or legs?  2. NO - Do you ever have any pain or discomfort in your chest?  3. NO - Do you have a history of  Heart Failure?  4. NO - Are you troubled by shortness of breath when: walking on the level, up a slight hill or at night?  5. NO - Do you currently have a cold, bronchitis or other respiratory infection?  6. NO - Do you have a cough, shortness of breath or wheezing?  7. NO - Do you sometimes get pains in the calves of your legs when you walk?  8. NO - Do you or anyone in your family have previous history of blood clots?  9. NO - Do you or does anyone in your family have a serious bleeding problem such as prolonged bleeding following surgeries or cuts?  10. NO - Have you ever had problems with anemia or been told to take iron pills?  11. NO - Have you had any abnormal blood loss such as black, tarry or bloody stools, or abnormal vaginal bleeding?  12. NO - Have you ever had a blood transfusion?  13. NO - Have you or any of your relatives ever had problems with anesthesia?  14. NO - Do you have sleep apnea, excessive snoring or daytime drowsiness?  15. NO - Do you have  any prosthetic heart valves?  16. NO - Do you have prosthetic joints?  17. NO - Is there any chance that you may be pregnant?      HPI:     HPI related to upcoming procedure: Bilateral bulging hernia noted 6 months ago- symptoms worse at end of the day and with activity /heavy lifting. Occasional pain.       See problem list for active medical problems.  Problems all longstanding and stable, except as noted/documented.  See ROS for pertinent symptoms related to these conditions.                                                                                                                                                          .    MEDICAL HISTORY:   There are no active problems to display for this patient.     No past medical history on file.  No past surgical history on file.  Current Outpatient Prescriptions   Medication Sig Dispense Refill     B Complex-C (VITAMIN B COMPLEX W/VITAMIN C) TABS tablet Take 1 tablet by mouth daily       CALCIUM PO 1 tablet daily when she remembers , unknown dose       Multiple Vitamins-Minerals (MULTIVITAMIN PO)        Probiotic Product (PRO-BIOTIC BLEND PO)        acetaminophen (TYLENOL) 500 MG tablet Take 1,000 mg by mouth every 6 hours as needed for mild pain       ibuprofen (ADVIL/MOTRIN) 200 MG tablet Take 600 mg by mouth every 6 hours as needed for mild pain       OTC products: None, except as noted above    Allergies   Allergen Reactions     Orange Fruit [Citrus] Anaphylaxis     Tomatoes [Tomato] Anaphylaxis     Sulfa Drugs Swelling     PN: eyes swelled     Trimethoprim      PN: eyes swelling / itching      Latex Allergy: NO    Social History   Substance Use Topics     Smoking status: Former Smoker     Quit date: 11/30/2007     Smokeless tobacco: Never Used     Alcohol use No     History   Drug Use No       REVIEW OF SYSTEMS:   CONSTITUTIONAL: NEGATIVE for fever, chills, change in weight  INTEGUMENTARY/SKIN: NEGATIVE for worrisome rashes, moles or lesions  EYES: NEGATIVE  for vision changes or irritation  ENT/MOUTH: NEGATIVE for ear, mouth and throat problems  RESP: NEGATIVE for significant cough or SOB  BREAST: NEGATIVE for masses, tenderness or discharge  CV: NEGATIVE for chest pain, palpitations or peripheral edema  GI: POSITIVE for bilateral hernia repair  : NEGATIVE for frequency, dysuria, or hematuria  MUSCULOSKELETAL: NEGATIVE for significant arthralgias or myalgia  NEURO: NEGATIVE for weakness, dizziness or paresthesias  ENDOCRINE: NEGATIVE for temperature intolerance, skin/hair changes  HEME: NEGATIVE for bleeding problems  PSYCHIATRIC: NEGATIVE for changes in mood or affect    EXAM:   /62 (BP Location: Left arm, Patient Position: Chair, Cuff Size: Adult Regular)  Pulse 61  Temp 97.1  F (36.2  C) (Tympanic)  Wt 130 lb (59 kg)  SpO2 98%  BMI 23.78 kg/m2    GENERAL APPEARANCE: healthy, alert and no distress     EYES: EOMI, PERRL     HENT: ear canals and TM's normal and nose and mouth without ulcers or lesions     NECK: no adenopathy, no asymmetry, masses, or scars and thyroid normal to palpation     RESP: lungs clear to auscultation - no rales, rhonchi or wheezes     CV: regular rates and rhythm, normal S1 S2, no S3 or S4 and no murmur, click or rub     ABDOMEN:  soft, nontender, no HSM or masses and bowel sounds normal     MS: extremities normal- no gross deformities noted, no evidence of inflammation in joints, FROM in all extremities.     SKIN: no suspicious lesions or rashes     NEURO: Normal strength and tone, sensory exam grossly normal, mentation intact and speech normal     PSYCH: mentation appears normal. and affect normal/bright     LYMPHATICS: No cervical adenopathy    DIAGNOSTICS:   No labs  required   EKG: Not indicated due to non-vascular surgery and low risk of event (age <65 and without cardiac risk factors)    No results for input(s): HGB, PLT, INR, NA, POTASSIUM, CR, A1C in the last 59012 hours.     IMPRESSION:   Reason for surgery/procedure:  Bilateral hernia repair  Diagnosis/reason for consult: Preoperative exam    The proposed surgical procedure is considered INTERMEDIATE risk.    REVISED CARDIAC RISK INDEX  The patient has the following serious cardiovascular risks for perioperative complications such as (MI, PE, VFib and 3  AV Block):  No serious cardiac risks  INTERPRETATION: 0 risks: Class I (very low risk - 0.4% complication rate)    The patient has the following additional risks for perioperative complications:  No identified additional risks      ICD-10-CM    1. Preop general physical exam Z01.818        RECOMMENDATIONS:     --Consult hospital rounder / IM to assist post-op medical management    --Patient is on no chronic medications    APPROVAL GIVEN to proceed with proposed procedure, without further diagnostic evaluation       Signed Electronically by: GIOVANI Wagoner CNP    Copy of this evaluation report is provided to requesting physician.    [facility] Preop Guidelines    Revised Cardiac Risk Index

## 2018-06-07 ENCOUNTER — ANESTHESIA EVENT (OUTPATIENT)
Dept: SURGERY | Facility: CLINIC | Age: 48
End: 2018-06-07
Payer: COMMERCIAL

## 2018-06-08 RX ORDER — CETIRIZINE HYDROCHLORIDE 10 MG/1
10 TABLET ORAL DAILY
COMMUNITY
End: 2024-08-22

## 2018-06-12 ENCOUNTER — ANESTHESIA (OUTPATIENT)
Dept: SURGERY | Facility: CLINIC | Age: 48
End: 2018-06-12
Payer: COMMERCIAL

## 2018-06-12 ENCOUNTER — HOSPITAL ENCOUNTER (OUTPATIENT)
Facility: CLINIC | Age: 48
Discharge: HOME | End: 2018-06-12
Attending: SURGERY | Admitting: SURGERY
Payer: COMMERCIAL

## 2018-06-12 VITALS
TEMPERATURE: 97.6 F | HEIGHT: 62 IN | BODY MASS INDEX: 23.92 KG/M2 | WEIGHT: 130 LBS | RESPIRATION RATE: 16 BRPM | DIASTOLIC BLOOD PRESSURE: 66 MMHG | OXYGEN SATURATION: 100 % | SYSTOLIC BLOOD PRESSURE: 104 MMHG | HEART RATE: 96 BPM

## 2018-06-12 DIAGNOSIS — G89.18 POST-OP PAIN: Primary | ICD-10-CM

## 2018-06-12 PROCEDURE — 37000008 ZZH ANESTHESIA TECHNICAL FEE, 1ST 30 MIN: Performed by: SURGERY

## 2018-06-12 PROCEDURE — 71000012 ZZH RECOVERY PHASE 1 LEVEL 1 FIRST HR: Performed by: SURGERY

## 2018-06-12 PROCEDURE — 71000027 ZZH RECOVERY PHASE 2 EACH 15 MINS: Performed by: SURGERY

## 2018-06-12 PROCEDURE — 27110028 ZZH OR GENERAL SUPPLY NON-STERILE: Performed by: SURGERY

## 2018-06-12 PROCEDURE — 49650 LAP ING HERNIA REPAIR INIT: CPT | Mod: 50 | Performed by: SURGERY

## 2018-06-12 PROCEDURE — 40000305 ZZH STATISTIC PRE PROC ASSESS I: Performed by: SURGERY

## 2018-06-12 PROCEDURE — 25000566 ZZH SEVOFLURANE, EA 15 MIN: Performed by: SURGERY

## 2018-06-12 PROCEDURE — 25000128 H RX IP 250 OP 636: Performed by: NURSE ANESTHETIST, CERTIFIED REGISTERED

## 2018-06-12 PROCEDURE — 49650 LAP ING HERNIA REPAIR INIT: CPT | Mod: 50 | Performed by: PHYSICIAN ASSISTANT

## 2018-06-12 PROCEDURE — 36000056 ZZH SURGERY LEVEL 3 1ST 30 MIN: Performed by: SURGERY

## 2018-06-12 PROCEDURE — 25000125 ZZHC RX 250: Performed by: SURGERY

## 2018-06-12 PROCEDURE — 25000132 ZZH RX MED GY IP 250 OP 250 PS 637: Performed by: SURGERY

## 2018-06-12 PROCEDURE — 25000128 H RX IP 250 OP 636: Performed by: SURGERY

## 2018-06-12 PROCEDURE — C1781 MESH (IMPLANTABLE): HCPCS | Performed by: SURGERY

## 2018-06-12 PROCEDURE — 25000125 ZZHC RX 250: Performed by: NURSE ANESTHETIST, CERTIFIED REGISTERED

## 2018-06-12 PROCEDURE — 36000058 ZZH SURGERY LEVEL 3 EA 15 ADDTL MIN: Performed by: SURGERY

## 2018-06-12 PROCEDURE — 71000013 ZZH RECOVERY PHASE 1 LEVEL 1 EA ADDTL HR: Performed by: SURGERY

## 2018-06-12 PROCEDURE — 27210794 ZZH OR GENERAL SUPPLY STERILE: Performed by: SURGERY

## 2018-06-12 PROCEDURE — 37000009 ZZH ANESTHESIA TECHNICAL FEE, EACH ADDTL 15 MIN: Performed by: SURGERY

## 2018-06-12 DEVICE — MESH PROGRIP LAPAROSCOPIC 5.9X3.9" PARIETEX SELF-FIX LPG1510: Type: IMPLANTABLE DEVICE | Site: INGUINAL | Status: FUNCTIONAL

## 2018-06-12 RX ORDER — ONDANSETRON 2 MG/ML
4 INJECTION INTRAMUSCULAR; INTRAVENOUS EVERY 30 MIN PRN
Status: DISCONTINUED | OUTPATIENT
Start: 2018-06-12 | End: 2018-06-12 | Stop reason: HOSPADM

## 2018-06-12 RX ORDER — ONDANSETRON 4 MG/1
4 TABLET, ORALLY DISINTEGRATING ORAL EVERY 30 MIN PRN
Status: DISCONTINUED | OUTPATIENT
Start: 2018-06-12 | End: 2018-06-12 | Stop reason: HOSPADM

## 2018-06-12 RX ORDER — LIDOCAINE HYDROCHLORIDE 10 MG/ML
INJECTION, SOLUTION INFILTRATION; PERINEURAL PRN
Status: DISCONTINUED | OUTPATIENT
Start: 2018-06-12 | End: 2018-06-12

## 2018-06-12 RX ORDER — HYDROCODONE BITARTRATE AND ACETAMINOPHEN 5; 325 MG/1; MG/1
1-2 TABLET ORAL EVERY 6 HOURS PRN
Qty: 60 TABLET | Refills: 0 | Status: SHIPPED | OUTPATIENT
Start: 2018-06-12 | End: 2021-11-11

## 2018-06-12 RX ORDER — ALBUTEROL SULFATE 0.83 MG/ML
2.5 SOLUTION RESPIRATORY (INHALATION) EVERY 4 HOURS PRN
Status: DISCONTINUED | OUTPATIENT
Start: 2018-06-12 | End: 2018-06-12 | Stop reason: HOSPADM

## 2018-06-12 RX ORDER — FENTANYL CITRATE 50 UG/ML
INJECTION, SOLUTION INTRAMUSCULAR; INTRAVENOUS PRN
Status: DISCONTINUED | OUTPATIENT
Start: 2018-06-12 | End: 2018-06-12

## 2018-06-12 RX ORDER — SODIUM CHLORIDE, SODIUM LACTATE, POTASSIUM CHLORIDE, CALCIUM CHLORIDE 600; 310; 30; 20 MG/100ML; MG/100ML; MG/100ML; MG/100ML
INJECTION, SOLUTION INTRAVENOUS CONTINUOUS
Status: DISCONTINUED | OUTPATIENT
Start: 2018-06-12 | End: 2018-06-12 | Stop reason: HOSPADM

## 2018-06-12 RX ORDER — FENTANYL CITRATE 50 UG/ML
25-50 INJECTION, SOLUTION INTRAMUSCULAR; INTRAVENOUS
Status: DISCONTINUED | OUTPATIENT
Start: 2018-06-12 | End: 2018-06-12 | Stop reason: HOSPADM

## 2018-06-12 RX ORDER — CEFAZOLIN SODIUM 2 G/100ML
2 INJECTION, SOLUTION INTRAVENOUS
Status: COMPLETED | OUTPATIENT
Start: 2018-06-12 | End: 2018-06-12

## 2018-06-12 RX ORDER — KETOROLAC TROMETHAMINE 30 MG/ML
30 INJECTION, SOLUTION INTRAMUSCULAR; INTRAVENOUS EVERY 6 HOURS PRN
Status: DISCONTINUED | OUTPATIENT
Start: 2018-06-12 | End: 2018-06-12 | Stop reason: HOSPADM

## 2018-06-12 RX ORDER — HYDROMORPHONE HYDROCHLORIDE 1 MG/ML
.3-.5 INJECTION, SOLUTION INTRAMUSCULAR; INTRAVENOUS; SUBCUTANEOUS EVERY 10 MIN PRN
Status: DISCONTINUED | OUTPATIENT
Start: 2018-06-12 | End: 2018-06-12 | Stop reason: HOSPADM

## 2018-06-12 RX ORDER — MEPERIDINE HYDROCHLORIDE 25 MG/ML
12.5 INJECTION INTRAMUSCULAR; INTRAVENOUS; SUBCUTANEOUS
Status: DISCONTINUED | OUTPATIENT
Start: 2018-06-12 | End: 2018-06-12 | Stop reason: HOSPADM

## 2018-06-12 RX ORDER — HYDROCODONE BITARTRATE AND ACETAMINOPHEN 5; 325 MG/1; MG/1
1-2 TABLET ORAL EVERY 4 HOURS PRN
Status: DISCONTINUED | OUTPATIENT
Start: 2018-06-12 | End: 2018-06-12 | Stop reason: HOSPADM

## 2018-06-12 RX ORDER — DEXAMETHASONE SODIUM PHOSPHATE 4 MG/ML
INJECTION, SOLUTION INTRA-ARTICULAR; INTRALESIONAL; INTRAMUSCULAR; INTRAVENOUS; SOFT TISSUE PRN
Status: DISCONTINUED | OUTPATIENT
Start: 2018-06-12 | End: 2018-06-12

## 2018-06-12 RX ORDER — GLYCOPYRROLATE 0.2 MG/ML
INJECTION, SOLUTION INTRAMUSCULAR; INTRAVENOUS PRN
Status: DISCONTINUED | OUTPATIENT
Start: 2018-06-12 | End: 2018-06-12

## 2018-06-12 RX ORDER — NALOXONE HYDROCHLORIDE 0.4 MG/ML
.1-.4 INJECTION, SOLUTION INTRAMUSCULAR; INTRAVENOUS; SUBCUTANEOUS
Status: DISCONTINUED | OUTPATIENT
Start: 2018-06-12 | End: 2018-06-12 | Stop reason: HOSPADM

## 2018-06-12 RX ORDER — BUPIVACAINE HYDROCHLORIDE AND EPINEPHRINE 2.5; 5 MG/ML; UG/ML
INJECTION, SOLUTION INFILTRATION; PERINEURAL PRN
Status: DISCONTINUED | OUTPATIENT
Start: 2018-06-12 | End: 2018-06-12 | Stop reason: HOSPADM

## 2018-06-12 RX ORDER — PROPOFOL 10 MG/ML
INJECTION, EMULSION INTRAVENOUS PRN
Status: DISCONTINUED | OUTPATIENT
Start: 2018-06-12 | End: 2018-06-12

## 2018-06-12 RX ORDER — ONDANSETRON 2 MG/ML
INJECTION INTRAMUSCULAR; INTRAVENOUS PRN
Status: DISCONTINUED | OUTPATIENT
Start: 2018-06-12 | End: 2018-06-12

## 2018-06-12 RX ORDER — LIDOCAINE 40 MG/G
CREAM TOPICAL
Status: DISCONTINUED | OUTPATIENT
Start: 2018-06-12 | End: 2018-06-12 | Stop reason: HOSPADM

## 2018-06-12 RX ORDER — CEFAZOLIN SODIUM 1 G/50ML
1 INJECTION, SOLUTION INTRAVENOUS SEE ADMIN INSTRUCTIONS
Status: DISCONTINUED | OUTPATIENT
Start: 2018-06-12 | End: 2018-06-12 | Stop reason: HOSPADM

## 2018-06-12 RX ADMIN — ROCURONIUM BROMIDE 20 MG: 10 INJECTION INTRAVENOUS at 07:49

## 2018-06-12 RX ADMIN — HYDROCODONE BITARTRATE AND ACETAMINOPHEN 1 TABLET: 5; 325 TABLET ORAL at 10:03

## 2018-06-12 RX ADMIN — PROPOFOL 200 MG: 10 INJECTION, EMULSION INTRAVENOUS at 07:49

## 2018-06-12 RX ADMIN — FENTANYL CITRATE 150 MCG: 50 INJECTION, SOLUTION INTRAMUSCULAR; INTRAVENOUS at 07:49

## 2018-06-12 RX ADMIN — ONDANSETRON 4 MG: 2 INJECTION INTRAMUSCULAR; INTRAVENOUS at 07:54

## 2018-06-12 RX ADMIN — DEXAMETHASONE SODIUM PHOSPHATE 4 MG: 4 INJECTION, SOLUTION INTRA-ARTICULAR; INTRALESIONAL; INTRAMUSCULAR; INTRAVENOUS; SOFT TISSUE at 07:54

## 2018-06-12 RX ADMIN — CEFAZOLIN SODIUM 2 G: 2 INJECTION, SOLUTION INTRAVENOUS at 07:46

## 2018-06-12 RX ADMIN — FENTANYL CITRATE 100 MCG: 50 INJECTION, SOLUTION INTRAMUSCULAR; INTRAVENOUS at 07:46

## 2018-06-12 RX ADMIN — PHENYLEPHRINE HYDROCHLORIDE 200 MCG: 10 INJECTION, SOLUTION INTRAMUSCULAR; INTRAVENOUS; SUBCUTANEOUS at 07:55

## 2018-06-12 RX ADMIN — LIDOCAINE HYDROCHLORIDE 1 ML: 10 INJECTION, SOLUTION EPIDURAL; INFILTRATION; INTRACAUDAL; PERINEURAL at 07:27

## 2018-06-12 RX ADMIN — GLYCOPYRROLATE 1 MG: 0.2 INJECTION, SOLUTION INTRAMUSCULAR; INTRAVENOUS at 07:56

## 2018-06-12 RX ADMIN — FENTANYL CITRATE 50 MCG: 50 INJECTION INTRAMUSCULAR; INTRAVENOUS at 09:08

## 2018-06-12 RX ADMIN — MIDAZOLAM 2 MG: 1 INJECTION INTRAMUSCULAR; INTRAVENOUS at 07:46

## 2018-06-12 RX ADMIN — LIDOCAINE HYDROCHLORIDE 50 MG: 10 INJECTION, SOLUTION INFILTRATION; PERINEURAL at 07:49

## 2018-06-12 RX ADMIN — FENTANYL CITRATE 50 MCG: 50 INJECTION INTRAMUSCULAR; INTRAVENOUS at 09:25

## 2018-06-12 RX ADMIN — SODIUM CHLORIDE, POTASSIUM CHLORIDE, SODIUM LACTATE AND CALCIUM CHLORIDE: 600; 310; 30; 20 INJECTION, SOLUTION INTRAVENOUS at 07:28

## 2018-06-12 ASSESSMENT — LIFESTYLE VARIABLES: TOBACCO_USE: 1

## 2018-06-12 NOTE — IP AVS SNAPSHOT
MRN:[id number]                      After Visit Summary   6/12/2018    Deneen Hyde    MRN: [id number]           Thank you!     Thank you for choosing [facility] for your care. Our goal is always to provide you with excellent care. Hearing back from our patients is one way we can continue to improve our services. Please take a few minutes to complete the written survey that you may receive in the mail after you visit with us. Thank you!        Patient Information     Date Of Birth          1970        About your hospital stay     You were admitted on:  June 12, 2018 You last received care in the:  [facility] PreOP/Phase II    You were discharged on:  June 12, 2018       Who to Call     For medical emergencies, please call 911.  For non-urgent questions about your medical care, please call your primary care provider or clinic, [phone]  For questions related to your surgery, please call your surgery clinic        Attending Provider     Provider Specialty    Gadiel Roblero MD Surgery       Primary Care Provider Office Phone # Fax #    Leann Green GIOVANI Cisneros [facility] [phone]      Further instructions from your care team       DISCHARGE INSTRUCTIONS     1. You may resume your regular diet when you feel you are ready    2. No lifting restrictions. Okay to lift as pain allows.    3. You will have some discomfort at the incision sites. This is expected. This should improve over the next 2-3 days. Ice and pain medication will help with this pain. Use prescribed pain medication as instructed.     4. Some bruising and mild swelling is normal after surgery. The area below and around the incision(s) may be hard and elevated. This is part of the normal healing process. This will resolve slowly over the next several months.     5. Your wounds are covered with glue. The glue is water tight and so you can shower or bathe immediately following surgery.     6. Use the following  medications (in addition to your normal meds) as shown:      Tylenol (acetaminophen) 500 mg every 6 hours as needed for pain.    Do not take more than 1000 mg of Tylenol every 6 hours -OR- 4g in a day    Motrin (ibuprophen) 600 mg every 6 hours as needed for pain. Take with food.     8. Notify Clinic at [phone] if:     Your discomfort is not relieved by your pain medication     You have signs of infection such as temperature above 100.4 degrees orally,  chills, or increasing daily discomfort.     Incision site is becoming more red and/or there is purulent drainage.      9. Follow up with Dr Roblero in 1-2 weeks.    10. When taking narcotics it is important to keep your stools soft to avoid constipation and pain with straining. This is best done by drinking and taking a stool softener (Metamucil or milk of magnesia).      11. Most people take the rest of the week off and return to work the following Monday. You may return sooner as pain allows. During your follow-up appointment, the doctor will give you a formal letter for your work with any restrictions detailed.  All disability or other such paperwork will be addressed at that time.                      Same Day Surgery Discharge Instructions  Special Precautions After Surgery - Adult    1. It is not unusual to feel lightheaded or faint, up to 24 hours after surgery or while taking pain medication.  If you have these symptoms; sit for a few minutes before standing and have someone assist you when getting up.  2. You should rest and relax for the next 24 hours and must have someone stay with you for at least 24 hours after your discharge.  3. DO NOT DRIVE any vehicle or operate mechanical equipment for 24 hours following the end of your surgery.  DO NOT DRIVE while taking narcotic pain medications that have been prescribed by your physician.  If you had a limb operated on, you must be able to use it fully to drive.  4. DO NOT drink alcoholic beverages for 24  "hours following surgery or while taking prescription pain medication.  5. Drink clear liquids (apple juice, ginger ale, broth, 7-Up, etc.).  Progress to your regular diet as you feel able.  6. Any questions call your physician and do not make important decisions for 24 hours.       Surgery Specialty Clinic:  [phone]     Same Day Surgery [phone], Monday thru Friday 6am-9pm.    Break through Bleeding  As instructed per Surgeon or Nurse.    Post Op Infection  Be alert for signs of infection: redness, swelling, heat, drainage of pus, and/or elevated temperature.  Contact your surgeon if these occur.    Nausea   If post op nausea occurs, at first rest your stomach for a few hours by eating nothing solid and sipping only clear liquids.  Call your Surgeon if nausea does not resolve in 24 hours.        Pending Results     No orders found from 6/10/2018 to 2018.            Admission Information     Date & Time Provider Department Dept. Phone    2018 Gadiel Roblero MD [facility] PreOP/Phase [phone]      Your Vitals Were     Blood Pressure Pulse Temperature Respirations Height Weight    106/70 96 97.6  F (36.4  C) (Oral) 16 1.575 m (5' 2\") 59 kg (130 lb)    Pulse Oximetry BMI (Body Mass Index)                100% 23.78 kg/m2          MyChart Information     [employer] lets you send messages to your doctor, view your test results, renew your prescriptions, schedule appointments and more. To sign up, go to [email or web address] . Click on \"Log in\" on the left side of the screen, which will take you to the Welcome page. Then click on \"Sign up Now\" on the right side of the page.     You will be asked to enter the access code listed below, as well as some personal information. Please follow the directions to create your username and password.     Your access code is: [email or web address]  Expires: 9/10/2018 10:09 AM     Your access code will  in 90 days. If you need help or a new code, please call your " [facility] or [phone].        Care EveryWhere ID     This is your Care EveryWhere ID. This could be used by other organizations to access your [facility] medical records  [id number]        Equal Access to Services     BOAZ HERNANDEZ : Hadii aad ku hadfelisaloli Rosendo, waaxda luqadaha, qaybta kaalmada ademorenayada, tony rehmanlula durant hammadsil solano. So [facility] [phone].    ATENCIÓN: Si habla español, tiene a mohamud disposición servicios gratuitos de asistencia lingüística. Llame al [phone].    We comply with applicable federal civil rights laws and [place] laws. We do not discriminate on the basis of race, color, national origin, age, disability, sex, sexual orientation, or gender identity.               Review of your medicines      START taking        Dose / Directions    HYDROcodone-acetaminophen 5-325 MG per tablet   Commonly known as:  NORCO   Used for:  Post-op pain   Notes to Patient:  You had 1 pain pill at 10:00 am.        Dose:  1-2 tablet   Take 1-2 tablets by mouth every 6 hours as needed for pain   Quantity:  60 tablet   Refills:  0         CONTINUE these medicines which have NOT CHANGED        Dose / Directions    acetaminophen 500 MG tablet   Commonly known as:  TYLENOL        Dose:  1000 mg   Take 1,000 mg by mouth every 6 hours as needed for mild pain   Refills:  0       CALCIUM PO        1 tablet daily when she remembers , unknown dose   Refills:  0       cetirizine 10 MG tablet   Commonly known as:  zyrTEC        Dose:  10 mg   Take 10 mg by mouth daily   Refills:  0       ibuprofen 200 MG tablet   Commonly known as:  ADVIL/MOTRIN        Dose:  600 mg   Take 600 mg by mouth every 6 hours as needed for mild pain   Refills:  0       MULTIVITAMIN PO        Refills:  0       PRO-BIOTIC BLEND PO        Refills:  0       vitamin b complex w/vitamin C Tabs tablet        Dose:  1 tablet   Take 1 tablet by mouth daily   Refills:  0            Where to get your medicines      Some of these will  need a paper prescription and others can be bought over the counter. Ask your nurse if you have questions.     Bring a paper prescription for each of these medications     HYDROcodone-acetaminophen 5-325 MG per tablet                Protect others around you: Learn how to safely use, store and throw away your medicines at www.disposemymeds.org.        Information about OPIOIDS     PRESCRIPTION OPIOIDS: WHAT YOU NEED TO KNOW   You have a prescription for an opioid (narcotic) pain medicine. Opioids can cause addiction. If you have a history of chemical dependency of any type, you are at a higher risk of becoming addicted to opioids. Only take this medicine after all other options have been tried. Take it for as short a time and as few doses as possible.     Do not:    Drive. If you drive while taking these medicines, you could be arrested for driving under the influence (DUI).    Operate heavy machinery    Do any other dangerous activities while taking these medicines.     Drink any alcohol while taking these medicines.      Take with any other medicines that contain acetaminophen. Read all labels carefully. Look for the word  acetaminophen  or  Tylenol.  Ask your pharmacist if you have questions or are unsure.    Store your pills in a secure place, locked if possible. We will not replace any lost or stolen medicine. If you don t finish your medicine, please throw away (dispose) as directed by your pharmacist. The [place] Pollution [employer] has more information about safe disposal: https://www.pca.[facility].mn.us/living-green/managing-unwanted-medications    All opioids tend to cause constipation. Drink plenty of water and eat foods that have a lot of fiber, such as fruits, vegetables, prune juice, apple juice and high-fiber cereal. Take a laxative (Miralax, milk of magnesia, Colace, Senna) if you don t move your bowels at least every other day.              Medication List: This is a list of all your medications  and when to take them. Check marks below indicate your daily home schedule. Keep this list as a reference.      Medications           Morning Afternoon Evening Bedtime As Needed    acetaminophen 500 MG tablet   Commonly known as:  TYLENOL   Take 1,000 mg by mouth every 6 hours as needed for mild pain                                CALCIUM PO   1 tablet daily when she remembers , unknown dose                                cetirizine 10 MG tablet   Commonly known as:  zyrTEC   Take 10 mg by mouth daily                                HYDROcodone-acetaminophen 5-325 MG per tablet   Commonly known as:  NORCO   Take 1-2 tablets by mouth every 6 hours as needed for pain   Last time this was given:  1 tablet on 6/12/2018 10:03 AM   Notes to Patient:  You had 1 pain pill at 10:00 am.                                ibuprofen 200 MG tablet   Commonly known as:  ADVIL/MOTRIN   Take 600 mg by mouth every 6 hours as needed for mild pain                                MULTIVITAMIN PO                                PRO-BIOTIC BLEND PO                                vitamin b complex w/vitamin C Tabs tablet   Take 1 tablet by mouth daily

## 2018-06-12 NOTE — BRIEF OP NOTE
PreOp Dx: Bilateral inguinal hernias    PostOp Dx: Bilateral inguinal hernias and left sided femoral hernia    Procedure: Laparoscopic bilateral inguinal hernia repair    Surgeon: ALYSSA Roblero    Anesthesia: GAYLE Rowan CRNA    Findings: Bilateral inguinal hernias and left sided femoral hernia    IVF: 900ml    UOP: male/a    EBL: 5ml      Gadiel Roblero MD

## 2018-06-12 NOTE — ANESTHESIA PREPROCEDURE EVALUATION
Anesthesia Evaluation     .             ROS/MED HX    ENT/Pulmonary:     (+)tobacco use, Past use , . .    Neurologic:       Cardiovascular:         METS/Exercise Tolerance:     Hematologic:         Musculoskeletal:         GI/Hepatic:         Renal/Genitourinary:         Endo:         Psychiatric:         Infectious Disease:         Malignancy:         Other:                     Physical Exam  Normal systems: cardiovascular, pulmonary and dental    Airway   Mallampati: II  TM distance: >3 FB  Neck ROM: full    Dental     Cardiovascular       Pulmonary                     Anesthesia Plan      History & Physical Review  History and physical reviewed and following examination; no interval change.    ASA Status:  2 .    NPO Status:  > 6 hours    Plan for General and ETT with Intravenous induction. Maintenance will be Balanced.    PONV prophylaxis:  Ondansetron (or other 5HT-3) and Dexamethasone or Solumedrol  Additional equipment: Videolaryngoscope      Postoperative Care  Postoperative pain management:  IV analgesics and Oral pain medications.      Consents  Anesthetic plan, risks, benefits and alternatives discussed with:  Patient..                          .

## 2018-06-12 NOTE — H&P
47-year-old female complaining of right groin mass and pain for the past several months. Patient does not remember any inciting event reports an obvious bulge in her right groin. She is very active and has Beth approaches she wants to start exercising more and this is starting to interfere with her activities of daily living. Pain is localized 1 out of 10 without radiation. Aggravated by straining and alleviated by rest. The mass is reducible. She denies fevers chills nausea and vomiting.     There is no problem list on file for this patient.         Past Medical History    History reviewed. No pertinent past medical history.         Past Surgical History    History reviewed. No pertinent surgical history.         Family History          Family History   Problem Relation Age of Onset     Thyroid Disease Mother       Suicide Father       Suicide Sister       Thyroid Disease Sister       Depression Daughter                    Social History   Substance Use Topics     Smoking status: Former Smoker       Quit date: 11/30/2007     Smokeless tobacco: Never Used     Alcohol use No             History   Drug Use No          Active Medications           Current Outpatient Prescriptions   Medication Sig Dispense Refill     Multiple Vitamins-Minerals (MULTIVITAMIN PO)           CALCIUM PO 1 tablet daily when she remembers , unknown dose         ibuprofen (ADVIL/MOTRIN) 200 MG tablet Take 600 mg by mouth every 6 hours as needed for mild pain         acetaminophen (TYLENOL) 500 MG tablet Take 1,000 mg by mouth every 6 hours as needed for mild pain                      Allergies   Allergen Reactions     Orange Fruit [Citrus] Anaphylaxis     Tomatoes [Tomato] Anaphylaxis     Sulfa Drugs Swelling       PN: eyes swelled     Trimethoprim         PN: eyes swelling / itching         ROS  Constitutional - Denies fevers, weight loss, malaise, lethargy  Neuro - Denies tremors or seizures  Pulmon - Denies SOB, dyspnea, hemoptysis,  "chronic cough or use of an inhaler  CV - Denies CP, SOB, lower extremity edema, difficulty w/ stairs, has never used NTG  GI - Denies hematemesis, BRBPR, melena, chronic diarrhea or epigastric pain   - Denies hematuria, difficulty voiding, h/o STDs  Hematology - Denies blood clotting disorders, chronic anemias  Dermatology - No melanomas or skin cancers  Rheumatology - No h/o RA  Pysch - Denies depression, bipolar d/o or schizophrenia     Exam:BP 97/52 (BP Location: Right arm)  Pulse 66  Temp 98.3  F (36.8  C) (Oral)  Resp 16  Ht 1.575 m (5' 2\")  Wt 59 kg (130 lb)  SpO2 100%  BMI 23.78 kg/m2      General - Alert and Oriented X4, NAD, well nourished  HEENT - Normocephalic, atraumatic, PERRLA, Nose midline, Throat without lesions  Neck - supple, no LAD, Thyroid normal, Carotids without bruits  Lungs - Clear to auscultation bilaterally with good inspiratory effort, no tactile fremitus  CV - Heart RRR, no lift's, thrills, murmurs, rubs, or gallops. Carotid, radial, and femoral pulses 2+ bilaterally  Abdomen - Soft, non-tender, +BS, no hepatosplenomegaly, no palpable masses  Groins - 2+ pulses bilaterally and no LAD, palpable reducible mass on right, palpable strong impulse on the left  Neuro - Full ROM, Strength 5/5 and major muscle groups, sensation intact  Extremities - No cyanosis, clubbing or edema     Assessment and plan: 47-year-old female with reducible bilateral inguinal hernias. Patient has good candidate for laparoscopic repair. Risks and benefits alternatives and complications were discussed with the patient including the possibility of infection bleeding or hernia recurrence. Patient understood and wished to proceed. PATIENT IS CLEARED FOR SURGERY.     Gadiel Roblero MD     "

## 2018-06-12 NOTE — DISCHARGE INSTRUCTIONS
DISCHARGE INSTRUCTIONS     1. You may resume your regular diet when you feel you are ready    2. No lifting restrictions. Okay to lift as pain allows.    3. You will have some discomfort at the incision sites. This is expected. This should improve over the next 2-3 days. Ice and pain medication will help with this pain. Use prescribed pain medication as instructed.     4. Some bruising and mild swelling is normal after surgery. The area below and around the incision(s) may be hard and elevated. This is part of the normal healing process. This will resolve slowly over the next several months.     5. Your wounds are covered with glue. The glue is water tight and so you can shower or bathe immediately following surgery.     6. Use the following medications (in addition to your normal meds) as shown:      Tylenol (acetaminophen) 500 mg every 6 hours as needed for pain.    Do not take more than 1000 mg of Tylenol every 6 hours -OR- 4g in a day    Motrin (ibuprophen) 600 mg every 6 hours as needed for pain. Take with food.     8. Notify Clinic at [phone] if:     Your discomfort is not relieved by your pain medication     You have signs of infection such as temperature above 100.4 degrees orally,  chills, or increasing daily discomfort.     Incision site is becoming more red and/or there is purulent drainage.      9. Follow up with Dr Roblero in 1-2 weeks.    10. When taking narcotics it is important to keep your stools soft to avoid constipation and pain with straining. This is best done by drinking and taking a stool softener (Metamucil or milk of magnesia).      11. Most people take the rest of the week off and return to work the following Monday. You may return sooner as pain allows. During your follow-up appointment, the doctor will give you a formal letter for your work with any restrictions detailed.  All disability or other such paperwork will be addressed at that time.                      Same Day Surgery  Discharge Instructions  Special Precautions After Surgery - Adult    1. It is not unusual to feel lightheaded or faint, up to 24 hours after surgery or while taking pain medication.  If you have these symptoms; sit for a few minutes before standing and have someone assist you when getting up.  2. You should rest and relax for the next 24 hours and must have someone stay with you for at least 24 hours after your discharge.  3. DO NOT DRIVE any vehicle or operate mechanical equipment for 24 hours following the end of your surgery.  DO NOT DRIVE while taking narcotic pain medications that have been prescribed by your physician.  If you had a limb operated on, you must be able to use it fully to drive.  4. DO NOT drink alcoholic beverages for 24 hours following surgery or while taking prescription pain medication.  5. Drink clear liquids (apple juice, ginger ale, broth, 7-Up, etc.).  Progress to your regular diet as you feel able.  6. Any questions call your physician and do not make important decisions for 24 hours.       Surgery Specialty Clinic:  [phone]     Same Day Surgery [phone], Monday thru Friday 6am-9pm.    Break through Bleeding  As instructed per Surgeon or Nurse.    Post Op Infection  Be alert for signs of infection: redness, swelling, heat, drainage of pus, and/or elevated temperature.  Contact your surgeon if these occur.    Nausea   If post op nausea occurs, at first rest your stomach for a few hours by eating nothing solid and sipping only clear liquids.  Call your Surgeon if nausea does not resolve in 24 hours.

## 2018-06-12 NOTE — IP AVS SNAPSHOT
[facility] PreOP/Phase II    [address] [address]    Phone:  [phone]    Fax:  [phone]                                       After Visit Summary   6/12/2018    Deneen Hyde    MRN: [id number]           After Visit Summary Signature Page     I have received my discharge instructions, and my questions have been answered. I have discussed any challenges I see with this plan with the nurse or doctor.    ..........................................................................................................................................  Patient/Patient Representative Signature      ..........................................................................................................................................  Patient Representative Print Name and Relationship to Patient    ..................................................               ................................................  Date                                            Time    ..........................................................................................................................................  Reviewed by Signature/Title    ...................................................              ..............................................  Date                                                            Time

## 2018-06-12 NOTE — ANESTHESIA POSTPROCEDURE EVALUATION
Patient: Deneen Hyde    Procedure(s):  Laparoscopic Bilateral Inguinal Hernia Repair  - Wound Class: I-Clean    Diagnosis:bilateral inguinal hernia  Diagnosis Additional Information: No value filed.    Anesthesia Type:  General, ETT    Note:  Anesthesia Post Evaluation    Patient location during evaluation: Bedside  Patient participation: Able to fully participate in evaluation  Level of consciousness: awake and alert  Pain management: adequate  Airway patency: patent  Cardiovascular status: acceptable  Respiratory status: acceptable  Hydration status: acceptable  PONV: none     Anesthetic complications: None          Last vitals:  Vitals:    06/12/18 0856 06/12/18 0900 06/12/18 0915   BP: 105/69 105/69 106/69   Pulse:  96    Resp: 16 16 16   Temp: 36.4  C (97.6  F)     SpO2:  97% 96%         Electronically Signed By: Yadiel Rowan CRNA, APRN CRNA  June 12, 2018  9:34 AM

## 2018-06-12 NOTE — OP NOTE
Procedure Date: 06/12/2018      DATE OF PROCEDURE:  06/12/2018      PREOPERATIVE DIAGNOSIS:  Bilateral inguinal hernias.      POSTOPERATIVE DIAGNOSIS:  Bilateral inguinal hernias.      PROCEDURE:  Laparoscopic bilateral inguinal hernia repair.      SURGEON:  Gadiel Roblero MD      ASSISTANT:  SANJUANITA Aragon (needed for expertise in camera operation, retraction, hemostasis and suctioning).      ANESTHESIA:  General.      ANESTHESIOLOGIST:  Harpal.      FINDINGS:  Bilateral inguinal hernias and a left-sided femoral hernia as well.      PROCEDURE:  The patient was taken to the operating room and placed in the supine position.  General endotracheal anesthesia was induced and surgical timeout was performed.  Two grams of Ancef were used as perioperative antibiotics.  Her abdomen was cleaned and draped in a sterile manner.  Marcaine 0.25% with epinephrine was used to anesthetize all port sites.  A small subumbilical curvilinear incision was made and the subcutaneous tissues dissected to the fascia.  The anterior fascia of the left rectus muscle was opened, revealing the muscle beneath.  This was pulled laterally, revealing the posterior fascia.  A dissecting balloon trocar was inserted into the preperitoneal space and insufflated under direct vision.  This was removed and a 10 mm trocar placed into the preperitoneal space.  Carbon dioxide was insufflated to a pressure of 15 mmHg.  Under direct vision, 2 midline 5 mm trocars were also placed.  Attention was first turned to the midline.  The loose areolar tissue covering the pubic arch was .  This dissection continued laterally to the right all the way to the right pelvic sidewall musculature and then down to the psoas muscle.  The hernia sac which was indirect on this side was removed from the inguinal canal as was the round ligament.  The canal was inspected, but there were no additional structures or lipomas.  A piece of Covidien ProGrip mesh was then  unrolled from superior to inferior crossing slightly left of midline and covering up Hesselbach's triangle as well as the indirect defect.  The inferior margin mesh tucked easily under the parietoperitoneum and was below Shay's ligament.  Attention was then turned to the left side.  Surprisingly, the left side was larger than the right, which did not seem to be the case in the clinic.  Again the left indirect inguinal hernia was discovered.  The sac was pulled from the inguinal canal.  There in the dissection a left-sided femoral hernia was also reduced.  Once all  these structures were cleared of preperitoneal tissue and the parietal peritoneum was swept out of the way another piece of Covidien ProGrip mesh was used.  It was unrolled from superior to inferior, overlapping the right-sided mesh at midline and covering Hesselbach's triangle, the femoral defect and the indirect defect.  An AbsorbaTack device was used to tack the mesh at the pubic tubercle across the posterior rectus muscle and along Shay's ligament to prevent mesh roll up into the triangle or the femoral canal.  Finally, all trocars were removed under direct vision and the air allowed to desufflate.  The fascial defect of the subumbilical port was closed using an 0 Vicryl suture in a figure-of-eight fashion, and this was injected with Marcaine.  All wounds were irrigated with normal saline and the skin closed using 4-0 Vicryl in a subcuticular fashion and dressed with Dermabond.  The patient tolerated the procedure well, extubated on the table, and transferred to the PACU in stable condition.      PLAN:  Following a stable postoperative course, she will be discharged to home with a regular diet and activity as tolerated.      DISABILITY:  None.      MEDICATIONS:  Prescription written for Vicodin.      INSTRUCTIONS:  The patient advised to wash her wounds daily with soap and water.  Follow up with me in 1 week.      ESTIMATED BLOOD LOSS:  5 mL.       INTRAVENOUS FLUIDS:  900 mL.         JADEN AYON             D: 2018   T: 2018   MT: CC      Name:     EDUARD MCCLELLAND   MRN:      [id number]        Account:        [id number]   :      1970           Procedure Date: 2018      Document: [id number]       cc: Leann Cisneros NP

## 2018-06-12 NOTE — ANESTHESIA CARE TRANSFER NOTE
Patient: Deneen Hyde    Procedure(s):  Laparoscopic Bilateral Inguinal Hernia Repair  - Wound Class: I-Clean    Diagnosis: bilateral inguinal hernia  Diagnosis Additional Information: No value filed.    Anesthesia Type:   General, ETT     Note:  Airway :Nasal Cannula  Patient transferred to:PACU  Handoff Report: Identifed the Patient, Identified the Reponsible Provider, Reviewed the pertinent medical history, Discussed the surgical course, Reviewed Intra-OP anesthesia mangement and issues during anesthesia, Set expectations for post-procedure period and Allowed opportunity for questions and acknowledgement of understanding      Vitals: (Last set prior to Anesthesia Care Transfer)    CRNA VITALS  6/12/2018 0825 - 6/12/2018 0900      6/12/2018             Resp Rate (observed): (!)  2                Electronically Signed By: Yadiel Rowan CRNA, APRN CRNA  June 12, 2018  9:00 AM

## 2018-08-10 ENCOUNTER — OFFICE VISIT (OUTPATIENT)
Dept: BEHAVIORAL HEALTH | Facility: CLINIC | Age: 48
End: 2018-08-10
Payer: COMMERCIAL

## 2018-08-10 DIAGNOSIS — F41.1 GAD (GENERALIZED ANXIETY DISORDER): Primary | ICD-10-CM

## 2018-08-10 DIAGNOSIS — F33.1 MAJOR DEPRESSIVE DISORDER, RECURRENT EPISODE, MODERATE (H): ICD-10-CM

## 2018-08-10 PROCEDURE — 90832 PSYTX W PT 30 MINUTES: CPT | Performed by: SOCIAL WORKER

## 2018-08-10 ASSESSMENT — ANXIETY QUESTIONNAIRES
3. WORRYING TOO MUCH ABOUT DIFFERENT THINGS: NEARLY EVERY DAY
7. FEELING AFRAID AS IF SOMETHING AWFUL MIGHT HAPPEN: MORE THAN HALF THE DAYS
6. BECOMING EASILY ANNOYED OR IRRITABLE: NEARLY EVERY DAY
1. FEELING NERVOUS, ANXIOUS, OR ON EDGE: NEARLY EVERY DAY
IF YOU CHECKED OFF ANY PROBLEMS ON THIS QUESTIONNAIRE, HOW DIFFICULT HAVE THESE PROBLEMS MADE IT FOR YOU TO DO YOUR WORK, TAKE CARE OF THINGS AT HOME, OR GET ALONG WITH OTHER PEOPLE: SOMEWHAT DIFFICULT
GAD7 TOTAL SCORE: 20
5. BEING SO RESTLESS THAT IT IS HARD TO SIT STILL: NEARLY EVERY DAY
2. NOT BEING ABLE TO STOP OR CONTROL WORRYING: NEARLY EVERY DAY

## 2018-08-10 ASSESSMENT — PATIENT HEALTH QUESTIONNAIRE - PHQ9: 5. POOR APPETITE OR OVEREATING: NEARLY EVERY DAY

## 2018-08-10 NOTE — PROGRESS NOTES
[facility]  August 10, 2018      Behavioral Health Clinician Progress Note    Patient Name: Deneen Hyde           Service Type: Individual      Service Location:  in clinic      Session Start Time: 7:30 AM  Session End Time: 7:55 AM      Session Length: 16 - 37      Attendees: Patient    Visit Activities (Refresh list every visit): NEW and [facility] Only    Diagnostic Assessment Date: Not completed  Treatment Plan Review Date: Not completed  See Flowsheets for today's PHQ-9 and KASSANDRA-7 results  Previous PHQ-9: No flowsheet data found.  Previous KASSANDRA-7: No flowsheet data found.    ERICA LEVEL:  No flowsheet data found.    DATA  Extended Session (60+ minutes): No  Interactive Complexity: No  Crisis: No    Treatment Objective(s) Addressed in This Session:  Target Behavior(s): disease management/lifestyle changes Decrease anxiety and depression    Depressed Mood: Increase interest, engagement, and pleasure in doing things  Decrease frequency and intensity of feeling down, depressed, hopeless  Improve quantity and quality of night time sleep / decrease daytime naps  Feel less tired and more energy during the day   Identify negative self-talk and behaviors: challenge core beliefs, myths, and actions  Improve concentration, focus, and mindfulness in daily activities   Feel less fidgety, restless or slow in daily activities / interpersonal interactions  Anxiety: will experience a reduction in anxiety, will develop more effective coping skills to manage anxiety symptoms, will develop healthy cognitive patterns and beliefs and will increase ability to function adaptively  Functional Impairment: will effectively address problems that interfere with adaptive functioning  Mood Instability: will develop better understanding of triggers and coping strategies to stabilize mood  Grief / Loss: will engage in effective approach to address and resolve grief/loss issues  PTSD Symptom Management    Current  Stressors / Issues:  Patient is referred by primary care provider.  This patient is struggling with  increased symptoms of anxiety and depression.  She would like a referral for a long-term therapist.  This writer will provide referral and will see patient until she is able to get him with long-term [facility] therapist.  Did not complete DA due to time constraints.  Complete DA next session.      Progress on Treatment Objective(s) / Homework:  None established    Motivational Interviewing    MI Intervention: Expressed Empathy/Understanding, Supported Autonomy, Collaboration, Evocation, Open-ended questions, Reflections: simple and complex, Change talk (evoked) and Reframe     Change Talk Expressed by the Patient: Desire to change Reasons to change Need to change Committment to change    Provider Response to Change Talk: E - Evoked more info from patient about behavior change, A - Affirmed patient's thoughts, decisions, or attempts at behavior change, R - Reflected patient's change talk and S - Summarized patient's change talk statements      Care Plan review completed: No    Medication Review:  No changes to current psychiatric medication(s)    Medication Compliance:  No    Changes in Health Issues:   None reported    Chemical Use Review:   Substance Use: Chemical use reviewed, no active concerns identified      Tobacco Use: No current tobacco use.      Assessment: Current Emotional / Mental Status (status of significant symptoms):  Risk status (Self / Other harm or suicidal ideation)  Patient denies a history of suicidal ideation, suicide attempts, self-injurious behavior, homicidal ideation, homicidal behavior and and other safety concerns  Patient denies current fears or concerns for personal safety.  Patient denies current or recent suicidal ideation or behaviors.  Patient denies current or recent homicidal ideation or behaviors.  Patient denies current or recent self injurious behavior or ideation.  Patient denies  other safety concerns.  A safety and risk management plan has not been developed at this time, however patient was encouraged to call [facility] should there be a change in any of these risk factors.    Appearance:   Appropriate   Eye Contact:   Good   Psychomotor Behavior: Normal   Attitude:   Cooperative   Orientation:   All  Speech   Rate / Production: Normal    Volume:  Normal   Mood:    Anxious  Depressed  Sad   Affect:    Worrisome  Tearful   Thought Content:  Clear   Thought Form:  Coherent  Logical   Insight:    Good     Diagnoses:  1. KASSANDRA (generalized anxiety disorder)    2. Major depressive disorder, recurrent episode, moderate (H)        Collateral Reports Completed:  Communicated with: Primary CARE provider as needed    Plan: (Homework, other):  Patient was given information about behavioral services and encouraged to schedule a follow up appointment with the clinic [facility] in 1 week.  She was also given information about mental health symptoms and treatment options .  CD Recommendations: No indications of CD issues. MIRIAM Turner, [facility]

## 2018-08-10 NOTE — MR AVS SNAPSHOT
After Visit Summary   8/10/2018    Deneen Hyde    MRN: [id number]           Patient Information     Date Of Birth          1970        Visit Information        Provider Department      8/10/2018 7:30 AM Tara Wolf LICSW [facility]        Today's Diagnoses     KASSANDRA (generalized anxiety disorder)    -  1    Major depressive disorder, recurrent episode, moderate (H)           Follow-ups after your visit        Additional Services     MENTAL HEALTH REFERRAL  - Adult; Outpatient Treatment; Individual/Couples/Family/Group Therapy/Health Psychology; G: [facility] [phone]; We will contact you to schedule the appointment or please call with any questions       All scheduling is subject to the client's specific insurance plan & benefits, provider/location availability, and provider clinical specialities.  Please arrive 15 minutes early for your first appointment and bring your completed paperwork.    Please be aware that coverage of these services is subject to the terms and limitations of your health insurance plan.  Call member services at your health plan with any benefit or coverage questions.                            Your next 10 appointments already scheduled     Aug 15, 2018  7:30 AM CDT   New Visit with MARK Sharma   [facility] ([facility])    [address] [place] [id number] [phone]              Who to contact     If you have questions or need follow up information about today's clinic visit or your schedule please contact [facility] directly at [phone].  Normal or non-critical lab and imaging results will be communicated to you by MyChart, letter or phone within 4 business days after the clinic has received the results. If you do not hear from us within 7 days, please contact the clinic through MyChart or phone. If you have a critical or abnormal  "lab result, we will notify you by phone as soon as possible.  Submit refill requests through [employer] or call your pharmacy and they will forward the refill request to us. Please allow 3 business days for your refill to be completed.          Additional Information About Your Visit        MyChart Information     [employer] lets you send messages to your doctor, view your test results, renew your prescriptions, schedule appointments and more. To sign up, go to [email or web address] . Click on \"Log in\" on the left side of the screen, which will take you to the Welcome page. Then click on \"Sign up Now\" on the right side of the page.     You will be asked to enter the access code listed below, as well as some personal information. Please follow the directions to create your username and password.     Your access code is: [email or web address]  Expires: 2018  6:47 AM     Your access code will  in 90 days. If you need help or a new code, please call your [facility] clinic or [phone].        Care EveryWhere ID     This is your Care EveryWhere ID. This could be used by other organizations to access your [facility] medical records  [id number]         Blood Pressure from Last 3 Encounters:   08/10/18 110/74   18 104/66   18 106/62    Weight from Last 3 Encounters:   08/10/18 134 lb 4.8 oz (60.9 kg)   18 130 lb (59 kg)   18 130 lb (59 kg)              We Performed the Following     MENTAL HEALTH REFERRAL  - Adult; Outpatient Treatment; Individual/Couples/Family/Group Therapy/Health Psychology; FMG: [facility] [phone]; We will contact you to schedule the appointment or please call with any questions        Primary Care Provider Office Phone # Fax #    GIOVANI Delong [facility] [phone] [address] [facility] [address]        Equal Access to Services     BOAZ HERNANDEZ AH: Guru Robbins, jeronimo alvarado, tony flowers " marcelo hoangmaris la'aan ah. So [facility] [phone].    ATENCIÓN: Si cristiane gomez, tiene a mohamud disposición servicios gratuitos de asistencia lingüística. Giuliana al [phone].    We comply with applicable federal civil rights laws and [place] laws. We do not discriminate on the basis of race, color, national origin, age, disability, sex, sexual orientation, or gender identity.            Thank you!     Thank you for choosing [facility]  for your care. Our goal is always to provide you with excellent care. Hearing back from our patients is one way we can continue to improve our services. Please take a few minutes to complete the written survey that you may receive in the mail after your visit with us. Thank you!             Your Updated Medication List - Protect others around you: Learn how to safely use, store and throw away your medicines at www.disposemymeds.org.          This list is accurate as of 8/10/18  8:03 AM.  Always use your most recent med list.                   Brand Name Dispense Instructions for use Diagnosis    acetaminophen 500 MG tablet    TYLENOL     Take 1,000 mg by mouth every 6 hours as needed for mild pain        CALCIUM PO      1 tablet daily when she remembers , unknown dose        cetirizine 10 MG tablet    zyrTEC     Take 10 mg by mouth daily        HYDROcodone-acetaminophen 5-325 MG per tablet    NORCO    60 tablet    Take 1-2 tablets by mouth every 6 hours as needed for pain    Post-op pain       ibuprofen 200 MG tablet    ADVIL/MOTRIN     Take 600 mg by mouth every 6 hours as needed for mild pain        MULTIVITAMIN PO           PRO-BIOTIC BLEND PO           vitamin b complex w/vitamin C Tabs tablet      Take 1 tablet by mouth daily

## 2018-08-11 ASSESSMENT — PATIENT HEALTH QUESTIONNAIRE - PHQ9: SUM OF ALL RESPONSES TO PHQ QUESTIONS 1-9: 17

## 2018-08-11 ASSESSMENT — ANXIETY QUESTIONNAIRES: GAD7 TOTAL SCORE: 20

## 2018-08-15 ENCOUNTER — OFFICE VISIT (OUTPATIENT)
Dept: BEHAVIORAL HEALTH | Facility: CLINIC | Age: 48
End: 2018-08-15
Payer: COMMERCIAL

## 2018-08-15 DIAGNOSIS — F33.1 MAJOR DEPRESSIVE DISORDER, RECURRENT EPISODE, MODERATE (H): ICD-10-CM

## 2018-08-15 DIAGNOSIS — F41.1 GAD (GENERALIZED ANXIETY DISORDER): Primary | ICD-10-CM

## 2018-08-15 PROCEDURE — 90832 PSYTX W PT 30 MINUTES: CPT | Performed by: SOCIAL WORKER

## 2018-08-15 NOTE — MR AVS SNAPSHOT
"              After Visit Summary   8/15/2018    Deneen Hyde    MRN: [id number]           Patient Information     Date Of Birth          1970        Visit Information        Provider Department      8/15/2018 7:30 AM Tara Wolf LICSW [facility]        Today's Diagnoses     KASSANDRA (generalized anxiety disorder)    -  1    Major depressive disorder, recurrent episode, moderate (H)           Follow-ups after your visit        Who to contact     If you have questions or need follow up information about today's clinic visit or your schedule please contact [facility] directly at [phone].  Normal or non-critical lab and imaging results will be communicated to you by MyChart, letter or phone within 4 business days after the clinic has received the results. If you do not hear from us within 7 days, please contact the clinic through [facility] or phone. If you have a critical or abnormal lab result, we will notify you by phone as soon as possible.  Submit refill requests through [employer] or call your pharmacy and they will forward the refill request to us. Please allow 3 business days for your refill to be completed.          Additional Information About Your Visit        MyChart Information     [employer] lets you send messages to your doctor, view your test results, renew your prescriptions, schedule appointments and more. To sign up, go to [email or web address] . Click on \"Log in\" on the left side of the screen, which will take you to the Welcome page. Then click on \"Sign up Now\" on the right side of the page.     You will be asked to enter the access code listed below, as well as some personal information. Please follow the directions to create your username and password.     Your access code is: [email or web address]  Expires: 2018  6:47 AM     Your access code will  in 90 days. If you need help or a new code, please call your [facility] clinic or [phone].      "   Care EveryWhere ID     This is your Care EveryWhere ID. This could be used by other organizations to access your [facility] medical records  [id number]         Blood Pressure from Last 3 Encounters:   08/10/18 110/74   06/12/18 104/66   05/22/18 106/62    Weight from Last 3 Encounters:   08/10/18 134 lb 4.8 oz (60.9 kg)   06/12/18 130 lb (59 kg)   05/22/18 130 lb (59 kg)              Today, you had the following     No orders found for display       Primary Care Provider Office Phone # Fax #    GIOVANI Delong [phone] [address] [address]        Equal Access to Services     BOAZ HERNANDEZ : Hadii akira biswaso Sozelalem, waaxda luqadaha, qaybta kaalmada adeegyada, tony ortega . So [facility] [phone].    ATENCIÓN: Si habla español, tiene a mohamud disposición servicios gratuitos de asistencia lingüística. [facility] [phone].    We comply with applicable federal civil rights laws and [place] laws. We do not discriminate on the basis of race, color, national origin, age, disability, sex, sexual orientation, or gender identity.            Thank you!     Thank you for choosing [facility]  for your care. Our goal is always to provide you with excellent care. Hearing back from our patients is one way we can continue to improve our services. Please take a few minutes to complete the written survey that you may receive in the mail after your visit with us. Thank you!             Your Updated Medication List - Protect others around you: Learn how to safely use, store and throw away your medicines at www.disposemymeds.org.          This list is accurate as of 8/15/18 11:59 PM.  Always use your most recent med list.                   Brand Name Dispense Instructions for use Diagnosis    acetaminophen 500 MG tablet    TYLENOL     Take 1,000 mg by mouth every 6 hours as needed for mild pain        CALCIUM PO      1 tablet daily when she  remembers , unknown dose        cetirizine 10 MG tablet    zyrTEC     Take 10 mg by mouth daily        HYDROcodone-acetaminophen 5-325 MG per tablet    NORCO    60 tablet    Take 1-2 tablets by mouth every 6 hours as needed for pain    Post-op pain       ibuprofen 200 MG tablet    ADVIL/MOTRIN     Take 600 mg by mouth every 6 hours as needed for mild pain        MULTIVITAMIN PO           PRO-BIOTIC BLEND PO           vitamin b complex w/vitamin C Tabs tablet      Take 1 tablet by mouth daily

## 2018-08-16 ASSESSMENT — ANXIETY QUESTIONNAIRES
2. NOT BEING ABLE TO STOP OR CONTROL WORRYING: NEARLY EVERY DAY
6. BECOMING EASILY ANNOYED OR IRRITABLE: NEARLY EVERY DAY
5. BEING SO RESTLESS THAT IT IS HARD TO SIT STILL: NEARLY EVERY DAY
7. FEELING AFRAID AS IF SOMETHING AWFUL MIGHT HAPPEN: NEARLY EVERY DAY
3. WORRYING TOO MUCH ABOUT DIFFERENT THINGS: NEARLY EVERY DAY
1. FEELING NERVOUS, ANXIOUS, OR ON EDGE: NEARLY EVERY DAY
IF YOU CHECKED OFF ANY PROBLEMS ON THIS QUESTIONNAIRE, HOW DIFFICULT HAVE THESE PROBLEMS MADE IT FOR YOU TO DO YOUR WORK, TAKE CARE OF THINGS AT HOME, OR GET ALONG WITH OTHER PEOPLE: EXTREMELY DIFFICULT
GAD7 TOTAL SCORE: 21

## 2018-08-16 ASSESSMENT — PATIENT HEALTH QUESTIONNAIRE - PHQ9: 5. POOR APPETITE OR OVEREATING: NEARLY EVERY DAY

## 2018-08-17 ASSESSMENT — PATIENT HEALTH QUESTIONNAIRE - PHQ9: SUM OF ALL RESPONSES TO PHQ QUESTIONS 1-9: 18

## 2018-08-17 ASSESSMENT — ANXIETY QUESTIONNAIRES: GAD7 TOTAL SCORE: 21

## 2018-08-27 NOTE — PROGRESS NOTES
[facility]  Integrated Behavioral Health Services   Diagnostic Assessment      PATIENT'S NAME: Deneen Hyde  MRN:   [id number]  :   1970  DATE OF SERVICE: August 15, 2018  SERVICE LOCATION: Face to Face in Clinic  Visit Activities: [facility] Only      Identifying Information:  Patient is a 47 year old year old, ,  female.  Patient attended the session alone.        Referral:  Patient was referred for an assessment by PCP at [facility].   Reason for referral: clarify behavioral health diagnosis, determine behavioral health treatment options, assess client readiness and motivation to change and assess client social situation.       Patient's Statement of Presenting Concern:  Patient reports the following reason(s) for seeking an assessment at this time: depressed mood and anxiety have increased.  Patient stated that her symptoms have resulted in the following functional impairments: health maintenance, home life with  and three children, relationship(s), self-care, social interactions and work / vocational responsibilities      History of Presenting Concern:  Patient reports that these problem(s) began in childhood.  In college, she was diagnosed with an eating disorder.  She was diagnosed with depression after her father's suicide 19 years ago.  She also reports her younger sister  5 years ago - killing her two children and herself.  Patient has many worries about her daughters who have both been diagnosed with depression and have had suicidal thoughts and episodes of self-harm.  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  She is concerned about returning in the fall and the increased stressors it will bring.  She may extend FMLA if needed.  Patient has attempted to resolve these concerns in the past through: counseling, medication(s) from physician / PCP, physician / PCP and psychiatry. Patient reports that other professional(s)  are involved in providing support / services.       Social History:  Patient reported she grew up in [place], [place]. She is  the first born of 3 children.  Patient reported that her childhood was hard at times due to father's depression.  Patient reported a history of 1 committed relationships or marriages. Patient has been  for 22 years. Patient reported having three children. Patient identified some stable and meaningful social connections.     Patient lives with  and three children.  Patient is currently employed full time.  Work  - [employer]    Patient reported that she has not been involved with the legal system.  Patient's highest education level was graduate school. Patient did not identify any learning problems. There are no ethnic, cultural or Yarsanism factors that may be relevant for therapy.  Patient did not serve in the .       Mental Health History:  Patient reported the following biological family members or relatives with mental health issues: Father experienced Depression and committed suicide 19 years ago, Daughter experienced Depression and daughter depression, Sister experienced Depression and committed suicide 5 years ago - killing herself and two young children. Patient has received the following mental health services in the past: counseling, physician / PCP, medication(s) from physician / PCP and psychiatry. Patient is currently receiving the following services: physician / PCP and medication(s) from physician / PCP.      Chemical Health History:  Patient reported the following biological family members or relatives with chemical health issues: Maternal Grandfather reportedly used alcohol , Paternal Grandfather reportedly used alcohol . Patient has not received chemical dependency treatment in the past. Patient is not currently receiving any chemical dependency treatment. Patient reports no problems as a result of their drinking / drug  use.      Cage-AID score is: negative.  Based on Cage-Aid score and clinical interview there  are not indications of drug or alcohol abuse.      Discussed the general effects of drugs and alcohol on health and well-being.      Significant Losses / Trauma / Abuse / Neglect Issues:  There are indications or report of significant loss, trauma, abuse or neglect issues related to: death of father, sister, niece and nephew and major medical problems stomach issues - also two daughters with depression -suicide attempts and self-harm.    Issues of possible neglect are not present.      Medical History:   There is no problem list on file for this patient.      Medication Review:  Current Outpatient Prescriptions   Medication     acetaminophen (TYLENOL) 500 MG tablet     B Complex-C (VITAMIN B COMPLEX W/VITAMIN C) TABS tablet     CALCIUM PO     cetirizine (ZYRTEC) 10 MG tablet     HYDROcodone-acetaminophen (NORCO) 5-325 MG per tablet     ibuprofen (ADVIL/MOTRIN) 200 MG tablet     Multiple Vitamins-Minerals (MULTIVITAMIN PO)     Probiotic Product (PRO-BIOTIC BLEND PO)     No current facility-administered medications for this visit.        Patient was provided recommendation to follow-up with physician.    Mental Status Assessment:  Appearance:   Appropriate   Eye Contact:   Good   Psychomotor Behavior: Normal  Restless   Attitude:   Cooperative   Orientation:   All  Speech   Rate / Production: Normal    Volume:  Normal   Mood:    Anxious  Normal Sad   Affect:    Appropriate  Worrisome   Thought Content:  Clear   Thought Form:  Coherent  Logical   Insight:    Good       Safety Assessment:    Patient denies a history of suicidal ideation, suicide attempts, self-injurious behavior, homicidal ideation, homicidal behavior and and other safety concerns  Patient denies current or recent suicidal ideation or behaviors.  Patient denies current or recent homicidal ideation or behaviors.  Patient denies current or recent self injurious  "behavior or ideation.  Patient denies other safety concerns.  Patient reports there are firearms in the house. The firearms are secured in a locked space  Protective Factors Children in the home , Sense of responsibility to family, Life Satisfaction, Reality testing ability, Positive coping skills, Positive problem-solving skills and Positive social support   Risk Factors Abrupt changes in clinical condition and Family history of suicide      Plan for Safety and Risk Management:  A safety and risk management plan has not been developed at this time, however patient was encouraged to call [place] [phone] should there be a change in any of these risk factors.      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 Patient identified the following supports: family and friends. Things that may interfere with the patien'ts success in behavioral health services include:few friends, financial hardship, lack of family support and lack of social support.    Diagnostic Criteria:  A. Excessive anxiety and worry about a number of events or activities (such as work or school performance).   B. The person finds it difficult to control the worry.   - Restlessness or feeling keyed up or on edge.    - Being easily " fatigued.    - Difficulty concentrating or mind going blank.    - Irritability.    - Muscle tension.    - Sleep disturbance (difficulty falling or staying asleep, or restless unsatisfying sleep).   D. The focus of the anxiety and worry is not confined to features of an Axis I disorder.  E. The anxiety, worry, or physical symptoms cause clinically significant distress or impairment in social, occupational, or other important areas of functioning.   F. The disturbance is not due to the direct physiological effects of a substance (e.g., a drug of abuse, a medication) or a general medical condition (e.g., hyperthyroidism) and does not occur exclusively during a Mood Disorder, a Psychotic Disorder, or a Pervasive Developmental Disorder.    - The aformentioned symptoms began 5 years and have increased recently due to daughter's mental health concerns ago and occurs 7 days per week and is experienced as severe.  CRITERIA (A-C) REPRESENT A MAJOR DEPRESSIVE EPISODE - SELECT THESE CRITERIA  A) Recurrent episode(s) - symptoms have been present during the same 2-week period and represent a change from previous functioning 5 or more symptoms (required for diagnosis)   - Depressed mood. Note: In children and adolescents, can be irritable mood.     - Diminished interest or pleasure in all, or almost all, activities.    - Decreased sleep.    - Psychomotor activity agitation.    - Fatigue or loss of energy.    - Feelings of worthlessness or inappropriate and excessive guilt.    - Diminished ability to think or concentrate, or indecisiveness.   B) The symptoms cause clinically significant distress or impairment in social, occupational, or other important areas of functioning  C) The episode is not attributable to the physiological effects of a substance or to another medical condition  D) The occurence of major depressive episode is not better explained by other thought / psychotic disorders  E) There has never been a manic episode  or hypomanic episode      Functional Status:  Patient's symptoms have caused and are causing reduced functional status in the following areas: Follow through with Medical recommendations - impairs motivation and energy  Occupational / Vocational - on FMLA   Social / Relational - impairs ability to maintain appropriate boundaries      DSM5 Diagnoses: (Sustained by DSM5 Criteria Listed Above)  Diagnoses: 296.33 (F33.2) Major Depressive Disorder, Recurrent Episode, Severe _ and With mixed features  300.02 (F41.1) Generalized Anxiety Disorder  Psychosocial & Contextual Factors: work and home stressors  WHODAS Score:33  See Media section of EPIC medical record for completed WHODAS    Preliminary Treatment Plan:    The client reports no currently identified Mormon, ethnic or cultural issues relevant to therapy.    Initial Treatment will focus on: Depressed Mood - decrease  Anxiety - decrease  Functional Impairment at: home and work.    Chemical dependency recommendations: No indications of CD issues    As a preliminary treatment goal, patient will experience a reduction in depressed mood, will develop more effective coping skills to manage depressive symptoms, will develop healthy cognitive patterns and beliefs, will increase ability to function adaptively and will continue to take medications as prescribed / participate in supportive activities and services , will experience a reduction in anxiety, will develop more effective coping skills to manage anxiety symptoms, will develop healthy cognitive patterns and beliefs and will increase ability to function adaptively and will effectively address problems that interfere with adaptive functioning.    The focus of initial interventions will be to alleviate anxiety, alleviate depressed mood, facilitate appropriate expression of feelings, increase ability to function adaptively, increase coping skills, increase self esteem, increase trust, process losses, process traumas,  provide homework to reinforce skill development, teach problem-solving skills, teach relaxation strategies and teach stress mangement techniques.    The patient is receiving treatment / structured support from the following professional(s) / service and treatment. Collaboration will be initiated with: primary care physician.    The following referral(s) will be initiated: referral made for [facility] therapist on 8-.    A Release of Information is not needed at this time.    Report to child or adult protection services was NAGabrilele Wolf, [facility], Behavioral Health Clinician

## 2018-11-13 ENCOUNTER — HEALTH MAINTENANCE LETTER (OUTPATIENT)
Age: 48
End: 2018-11-13

## 2019-12-10 ENCOUNTER — OFFICE VISIT (OUTPATIENT)
Dept: FAMILY MEDICINE | Facility: CLINIC | Age: 49
End: 2019-12-10
Payer: COMMERCIAL

## 2019-12-10 VITALS
HEIGHT: 62 IN | HEART RATE: 73 BPM | OXYGEN SATURATION: 98 % | RESPIRATION RATE: 16 BRPM | WEIGHT: 144 LBS | DIASTOLIC BLOOD PRESSURE: 64 MMHG | SYSTOLIC BLOOD PRESSURE: 92 MMHG | BODY MASS INDEX: 26.5 KG/M2 | TEMPERATURE: 98.9 F

## 2019-12-10 DIAGNOSIS — R63.5 WEIGHT GAIN: ICD-10-CM

## 2019-12-10 DIAGNOSIS — Z13.1 SCREENING FOR DIABETES MELLITUS: ICD-10-CM

## 2019-12-10 DIAGNOSIS — Z00.00 ROUTINE GENERAL MEDICAL EXAMINATION AT A HEALTH CARE FACILITY: ICD-10-CM

## 2019-12-10 DIAGNOSIS — Z91.018 FOOD ALLERGY: Primary | ICD-10-CM

## 2019-12-10 DIAGNOSIS — Z13.6 CARDIOVASCULAR SCREENING; LDL GOAL LESS THAN 100: ICD-10-CM

## 2019-12-10 PROCEDURE — 99213 OFFICE O/P EST LOW 20 MIN: CPT | Mod: 25 | Performed by: NURSE PRACTITIONER

## 2019-12-10 PROCEDURE — 99396 PREV VISIT EST AGE 40-64: CPT | Performed by: NURSE PRACTITIONER

## 2019-12-10 RX ORDER — EPINEPHRINE 0.3 MG/.3ML
0.3 INJECTION SUBCUTANEOUS PRN
Qty: 1 EACH | Refills: 0 | Status: SHIPPED | OUTPATIENT
Start: 2019-12-10 | End: 2021-11-11

## 2019-12-10 ASSESSMENT — MIFFLIN-ST. JEOR: SCORE: 1231.43

## 2019-12-10 ASSESSMENT — PAIN SCALES - GENERAL: PAINLEVEL: NO PAIN (0)

## 2019-12-10 NOTE — PROGRESS NOTES
SUBJECTIVE:   CC: Deneen Hyde is an 49 year old woman who presents for preventive health visit.     Healthy Habits:    Getting at least 3 servings of Calcium per day:  Yes    Bi-annual eye exam:  Yes    Dental care twice a year:  Yes    Sleep apnea or symptoms of sleep apnea:  None    Diet:  Regular (no restrictions)    Frequency of exercise:  2-3 days/week    Duration of exercise:  30-45 minutes    Taking medications regularly:  Yes    Barriers to taking medications:  None    Medication side effects:  None    PHQ-2 Total Score:    Additional concerns today:  No          PROBLEMS TO ADD ON...    Patient needs refill of Epi pen- she has multiple food allergies and would like her epi pen updated- she has never had to use it.     Weight gain- gained 10 + lbs. patient would like to see a dietician to help with weight loss . Patient is not exercising.  Patient with history of eating disorder.     Today's PHQ-2 Score:   PHQ-2 (  [employer]) 2017   Q1: Little interest or pleasure in doing things 0   Q2: Feeling down, depressed or hopeless 0   PHQ-2 Score 0       Abuse: Current or Past(Physical, Sexual or Emotional)- No  Do you feel safe in your environment? Yes        Social History     Tobacco Use     Smoking status: Former Smoker     Last attempt to quit: 2007     Years since quittin.0     Smokeless tobacco: Never Used   Substance Use Topics     Alcohol use: No     If you drink alcohol do you typically have >3 drinks per day or >7 drinks per week? Not applicable    No flowsheet data found.    Reviewed orders with patient.  Reviewed health maintenance and updated orders accordingly - Yes  BP Readings from Last 3 Encounters:   12/10/19 92/64   08/10/18 110/74   18 104/66    Wt Readings from Last 3 Encounters:   12/10/19 65.3 kg (144 lb)   08/10/18 60.9 kg (134 lb 4.8 oz)   18 59 kg (130 lb)                    Mammogram Screening: Patient under age 50, mutual decision reflected in  "health maintenance.      Pertinent mammograms are reviewed under the imaging tab.  History of abnormal Pap smear: Last 3 Pap and HPV Results:       Reviewed and updated as needed this visit by clinical staff  Tobacco  Allergies  Meds  Med Hx  Surg Hx  Fam Hx  Soc Hx        Reviewed and updated as needed this visit by Provider            Review of Systems  CONSTITUTIONAL: NEGATIVE for fever, chills, change in weight  INTEGUMENTARY/SKIN: NEGATIVE for worrisome rashes, moles or lesions  EYES: NEGATIVE for vision changes or irritation  ENT: NEGATIVE for ear, mouth and throat problems  RESP: NEGATIVE for significant cough or SOB  BREAST: NEGATIVE for masses, tenderness or discharge  CV: NEGATIVE for chest pain, palpitations or peripheral edema  GI: NEGATIVE for nausea, abdominal pain, heartburn, or change in bowel habits  : NEGATIVE for unusual urinary or vaginal symptoms. No vaginal bleeding.  MUSCULOSKELETAL: NEGATIVE for significant arthralgias or myalgia  NEURO: NEGATIVE for weakness, dizziness or paresthesias  PSYCHIATRIC: NEGATIVE for changes in mood or affect      OBJECTIVE:   BP 92/64 (BP Location: Left arm, Patient Position: Sitting, Cuff Size: Adult Regular)   Pulse 73   Temp 98.9  F (37.2  C) (Tympanic)   Resp 16   Ht 1.575 m (5' 2\")   Wt 65.3 kg (144 lb)   SpO2 98%   Breastfeeding No   BMI 26.34 kg/m    Physical Exam  GENERAL: healthy, alert and no distress  EYES: Eyes grossly normal to inspection, PERRL and conjunctivae and sclerae normal  HENT: ear canals and TM's normal, nose and mouth without ulcers or lesions  NECK: no adenopathy, no asymmetry, masses, or scars and thyroid normal to palpation  RESP: lungs clear to auscultation - no rales, rhonchi or wheezes  CV: regular rate and rhythm, normal S1 S2, no S3 or S4, no murmur, click or rub, no peripheral edema and peripheral pulses strong  ABDOMEN: soft, nontender, no hepatosplenomegaly, no masses and bowel sounds normal  MS: no gross " "musculoskeletal defects noted, no edema  SKIN: no suspicious lesions or rashes  NEURO: Normal strength and tone, mentation intact and speech normal  PSYCH: mentation appears normal, affect normal/bright    Diagnostic Test Results:  Labs reviewed in Epic    ASSESSMENT/PLAN:   1. Routine general medical examination at a health care facility    - CBC with platelets differential; Future    2. Food allergy    - EPINEPHrine (EPIPEN/ADRENACLICK/OR ANY BX GENERIC EQUIV) 0.3 MG/0.3ML injection 2-pack; Inject 0.3 mLs (0.3 mg) into the muscle as needed for anaphylaxis  Dispense: 1 each; Refill: 0    3. CARDIOVASCULAR SCREENING; LDL GOAL LESS THAN 100    - Lipid panel reflex to direct LDL Fasting; Future    4. Weight gain  ? Thyroid   - start exercising -  - TSH with free T4 reflex; Future  - NUTRITION REFERRAL    5. Screening for diabetes mellitus    - Basic metabolic panel; Future    COUNSELING:  Reviewed preventive health counseling, as reflected in patient instructions       Regular exercise       Healthy diet/nutrition    Estimated body mass index is 26.34 kg/m  as calculated from the following:    Height as of this encounter: 1.575 m (5' 2\").    Weight as of this encounter: 65.3 kg (144 lb).    Weight management plan: Patient referred to endocrine and/or weight management specialty Discussed healthy diet and exercise guidelines     reports that she quit smoking about 12 years ago. She has never used smokeless tobacco.      Counseling Resources:  ATP IV Guidelines  Pooled Cohorts Equation Calculator  Breast Cancer Risk Calculator  FRAX Risk Assessment  ICSI Preventive Guidelines  Dietary Guidelines for Americans, 2010  USDA's MyPlate  ASA Prophylaxis  Lung CA Screening    GIOVANI Delong CNP  [facility]  "

## 2019-12-10 NOTE — PATIENT INSTRUCTIONS
Thank you for choosing [facility].  You may be receiving an email and/or telephone survey request from [facility] Customer Experience regarding your visit today.  Please take a few minutes to respond to the survey to let us know how we are doing.      If you have questions or concerns, please contact us via [facility] or you can contact your care team at [phone].    Our Clinic hours are:  Monday 6:40 am  to 7:00 pm  Tuesday -Friday 6:40 am to 5:00 pm    The [place] outpatient lab hours are:  Monday - Friday 6:10 am to 4:45 pm  Saturdays 7:00 am to 11:00 am  Appointments are required, call [phone]    If you have clinical questions after hours or would like to schedule an appointment,  call the clinic at [phone].

## 2020-01-15 ENCOUNTER — HOSPITAL ENCOUNTER (OUTPATIENT)
Dept: NUTRITION | Facility: CLINIC | Age: 50
End: 2020-01-15
Attending: NURSE PRACTITIONER
Payer: COMMERCIAL

## 2020-01-15 ENCOUNTER — HOSPITAL ENCOUNTER (OUTPATIENT)
Dept: MAMMOGRAPHY | Facility: CLINIC | Age: 50
Discharge: HOME | End: 2020-01-15
Attending: NURSE PRACTITIONER | Admitting: NURSE PRACTITIONER
Payer: COMMERCIAL

## 2020-01-15 VITALS — WEIGHT: 146.6 LBS | BODY MASS INDEX: 26.81 KG/M2

## 2020-01-15 DIAGNOSIS — R63.5 WEIGHT GAIN: ICD-10-CM

## 2020-01-15 DIAGNOSIS — Z13.1 SCREENING FOR DIABETES MELLITUS: ICD-10-CM

## 2020-01-15 DIAGNOSIS — Z13.6 CARDIOVASCULAR SCREENING; LDL GOAL LESS THAN 100: ICD-10-CM

## 2020-01-15 DIAGNOSIS — Z12.31 VISIT FOR SCREENING MAMMOGRAM: ICD-10-CM

## 2020-01-15 DIAGNOSIS — Z00.00 ROUTINE GENERAL MEDICAL EXAMINATION AT A HEALTH CARE FACILITY: ICD-10-CM

## 2020-01-15 LAB
ANION GAP SERPL CALCULATED.3IONS-SCNC: 5 MMOL/L (ref 3–14)
BASOPHILS # BLD AUTO: 0 10E9/L (ref 0–0.2)
BASOPHILS NFR BLD AUTO: 0.3 %
BUN SERPL-MCNC: 16 MG/DL (ref 7–30)
CALCIUM SERPL-MCNC: 9.1 MG/DL (ref 8.5–10.1)
CHLORIDE SERPL-SCNC: 107 MMOL/L (ref 94–109)
CHOLEST SERPL-MCNC: 151 MG/DL
CO2 SERPL-SCNC: 25 MMOL/L (ref 20–32)
CREAT SERPL-MCNC: 0.67 MG/DL (ref 0.52–1.04)
DIFFERENTIAL METHOD BLD: NORMAL
EOSINOPHIL # BLD AUTO: 0.1 10E9/L (ref 0–0.7)
EOSINOPHIL NFR BLD AUTO: 1.2 %
ERYTHROCYTE [DISTWIDTH] IN BLOOD BY AUTOMATED COUNT: 12.7 % (ref 10–15)
GFR SERPL CREATININE-BSD FRML MDRD: >90 ML/MIN/{1.73_M2}
GLUCOSE SERPL-MCNC: 83 MG/DL (ref 70–99)
HCT VFR BLD AUTO: 39.1 % (ref 35–47)
HDLC SERPL-MCNC: 78 MG/DL
HGB BLD-MCNC: 12.9 G/DL (ref 11.7–15.7)
LDLC SERPL CALC-MCNC: 60 MG/DL
LYMPHOCYTES # BLD AUTO: 2.1 10E9/L (ref 0.8–5.3)
LYMPHOCYTES NFR BLD AUTO: 30.5 %
MCH RBC QN AUTO: 29.9 PG (ref 26.5–33)
MCHC RBC AUTO-ENTMCNC: 33 G/DL (ref 31.5–36.5)
MCV RBC AUTO: 91 FL (ref 78–100)
MONOCYTES # BLD AUTO: 0.6 10E9/L (ref 0–1.3)
MONOCYTES NFR BLD AUTO: 8.3 %
NEUTROPHILS # BLD AUTO: 4.1 10E9/L (ref 1.6–8.3)
NEUTROPHILS NFR BLD AUTO: 59.7 %
NONHDLC SERPL-MCNC: 73 MG/DL
PLATELET # BLD AUTO: 282 10E9/L (ref 150–450)
POTASSIUM SERPL-SCNC: 3.8 MMOL/L (ref 3.4–5.3)
RBC # BLD AUTO: 4.31 10E12/L (ref 3.8–5.2)
SODIUM SERPL-SCNC: 137 MMOL/L (ref 133–144)
TRIGL SERPL-MCNC: 66 MG/DL
TSH SERPL DL<=0.005 MIU/L-ACNC: 3.57 MU/L (ref 0.4–4)
WBC # BLD AUTO: 6.9 10E9/L (ref 4–11)

## 2020-01-15 PROCEDURE — 85025 COMPLETE CBC W/AUTO DIFF WBC: CPT | Performed by: NURSE PRACTITIONER

## 2020-01-15 PROCEDURE — 97802 MEDICAL NUTRITION INDIV IN: CPT | Performed by: DIETITIAN, REGISTERED

## 2020-01-15 PROCEDURE — 36415 COLL VENOUS BLD VENIPUNCTURE: CPT | Performed by: NURSE PRACTITIONER

## 2020-01-15 PROCEDURE — 84443 ASSAY THYROID STIM HORMONE: CPT | Performed by: NURSE PRACTITIONER

## 2020-01-15 PROCEDURE — 80061 LIPID PANEL: CPT | Performed by: NURSE PRACTITIONER

## 2020-01-15 PROCEDURE — 80048 BASIC METABOLIC PNL TOTAL CA: CPT | Performed by: NURSE PRACTITIONER

## 2020-01-15 PROCEDURE — 77067 SCR MAMMO BI INCL CAD: CPT

## 2020-01-15 NOTE — PROGRESS NOTES
"[facility]  Medical Nutrition Therapy    Visit Type: Initial Assessment    Deneen Hyde referred by Leann MATUTE CNP for MNT related to Weight Gain      Nutrition Assessment:  Anthropometrics  Height: 5' 2\"    BMI: 26.6  (overweight range)      Weight: 146.6 lb/66.5 kg    BSA: 1.69 m2      IBW (kg): 110 lb/50 kg           Wt Readings from Last 10 Encounters:   12/10/19 65.3 kg (144 lb)   08/10/18 60.9 kg (134 lb 4.8 oz)   06/12/18 59 kg (130 lb)   05/22/18 59 kg (130 lb)   03/14/18 60.1 kg (132 lb 9.6 oz)   01/04/18 60.4 kg (133 lb 3.2 oz)   12/21/17 59.8 kg (131 lb 12.8 oz)   11/30/17 60.4 kg (133 lb 3.2 oz)   -Per chart above, pt has gained 12 lb over the past year. She was maintaining wt from 130-134 lb over the past few years prior.   -Pt reports that she lost 100 lb in 2013 and then maintained wt at 130-135 lb until 2018.    Nutrition History  -Pt here seeking help w/wt loss. She gained 12 lb over the past year and would like to lose it.   -She has a hx of over eating disorder and is a part of a 12 step program, [facility]. She really enjoys being a part of this group and reports that it has changed her life for the better. She has been able to manage her ED w/the help of this group. Calls her sponsor daily and journals her food intake. Pt follows an exchange system for how much food she allows herself to eat each day. She learned this system through working with dietitians in the past. She met with a dietitian several times in 2017.  -Pt reports being more sedentary over the past year d/t switching jobs. 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 She also reports eating more cheese and gourmet bread over the holidays, than she used to.      Physical Activity  -None at the moment. Has done a yoga a few times lately and would like do this more often. She is a member of the [employer].      Nutrition " Prescription: Using adj wt of 54 kg    Energy: 1198-4286 kcal/day  (25-30 kcal/kg)  -Recommend 1,400 kcal/day meal plan for wt loss   Protein: 43-54 g/day  (0.8-1 g/kg)  7 exchanges         Fluid: 1,620 mL/day         Fat:   4 exchanges       Carbohydrate:   11 exchanges   (3 milk, 2 fruit, 2 starchy veggies, 4 grains)    Fiber: 25 g/day               Non-starchy Veggies:  3 exchanges          Food Record  -B: 1 protein, 1 starch, 1 fruit, 1 fat, 1 milk  Snack: milk, fruit, fat  L: 3 proteins, 2 starches, 1 fruit, 1 fat, 3 veg, 1 milk  D: 3 proteins, 2 starches, 2 veg, 2 fat  Snack: 1 protein, 1 starch   -Pt prefers to follow an exchange system for meal planning  -Uses unsweetened almond milk and likes to snack on cheese (reports that she doesn't follow portion control for this)  -She uses a food scale to weigh her food to determine how many grams to eat. Eats 28 g of protein per serving   -Drinks water during the day but reports that she needs to drink more    Nutrition Diagnosis:  Unintended weight gain r/t sedentary lifestyle/excessive oral intake aeb pt report of low daily physical activity and increasing daily caloric intake and wt gain of 12 lb over the past year    Nutrition Intervention:  Educated pt on her daily estimated needs and calculated the exchanges needed to meet her needs (as this is pt's preferred method for tracking energy intake):  Recommend the following meal plan:  B: 2 P, 1 G, 1 M, 1 Fruit, 1 F  L: 2 P, 1 G, 2 NSV, 1 SV, 1 M, 1 Fruit, 1 F  D: 2 P, 1 G, 1 NSV, 1 SV, 1 M, 2 F  Snack: 1 P, 1 G    -Discussed healthy food choices and food choices to limit. Suggested switching from coconut oil to olive oil.   -Educated pt on portion sizes and how to plan balanced meals.  -Together, started creating a weekly meal plan on a menu. Pt will complete the menu at home.   -Suggested increasing daily PA to assist w/wt loss. Suggested trying to get in 150 min per week or 20 min per day of moderate intensity  exercise.  -Encouraged pt to continue working her 12 Step Program.    Nutrition Goals:  1) Follow meal plan created  2) Exercise for 45-60 min 3x/week doing classes at the [facility].     Nutrition Follow Up / Monitoring:  Weight, PO intake, PA     Nutrition Recommendations:  Patient to follow-up with RD in 4 weeks.  Patient has RD contact information to call/email if needed.    Start Time: 9:52  End Time: 10:57      Isela Warner RD, LD  Clinical Dietitian  [facility]: [phone]  [facility]: [phone]

## 2020-01-16 ENCOUNTER — TELEPHONE (OUTPATIENT)
Dept: FAMILY MEDICINE | Facility: CLINIC | Age: 50
End: 2020-01-16

## 2020-01-16 DIAGNOSIS — R92.8 ABNORMAL MAMMOGRAM: Primary | ICD-10-CM

## 2020-01-16 NOTE — TELEPHONE ENCOUNTER
US ordered per providers recommendations.  Notes recorded by Leann Cisneros, GIOVANI GLEASON on 1/16/2020 at 7:37 AM CST  Please make sure this patient is scheduled for ultrasound of breast for abnormality.    Ginger ZARATE RN

## 2020-01-16 NOTE — TELEPHONE ENCOUNTER
Reason for Call: Request for an order or referral:    Order or referral being requested: Patient was given results for imaging yesterday and patient was told to get an ultrasound. The order is not placed.    Please make sure this patient is scheduled for ultrasound of breast for abnormality  Date needed: at your convenience    Has the patient been seen by the PCP for this problem? YES    Phone number Patient can be reached at:  Home number on file [phone] (home)    Best Time:  any    Can we leave a detailed message on this number?  YES    Call taken on 1/16/2020 at 7:56 AM by Maria L Pham

## 2020-01-17 ENCOUNTER — HOSPITAL ENCOUNTER (OUTPATIENT)
Dept: ULTRASOUND IMAGING | Facility: CLINIC | Age: 50
Discharge: HOME | End: 2020-01-17
Attending: NURSE PRACTITIONER | Admitting: NURSE PRACTITIONER
Payer: COMMERCIAL

## 2020-01-17 DIAGNOSIS — R92.8 ABNORMAL MAMMOGRAM: ICD-10-CM

## 2020-01-17 PROCEDURE — 76642 ULTRASOUND BREAST LIMITED: CPT | Mod: LT

## 2020-02-20 ENCOUNTER — HOSPITAL ENCOUNTER (OUTPATIENT)
Dept: NUTRITION | Facility: CLINIC | Age: 50
Discharge: HOME | End: 2020-02-20
Attending: INTERNAL MEDICINE | Admitting: INTERNAL MEDICINE
Payer: COMMERCIAL

## 2020-02-20 PROCEDURE — 97803 MED NUTRITION INDIV SUBSEQ: CPT | Performed by: DIETITIAN, REGISTERED

## 2020-03-11 ENCOUNTER — HEALTH MAINTENANCE LETTER (OUTPATIENT)
Age: 50
End: 2020-03-11

## 2020-06-09 ENCOUNTER — HOSPITAL ENCOUNTER (OUTPATIENT)
Dept: NUTRITION | Facility: CLINIC | Age: 50
Discharge: HOME | End: 2020-06-09
Attending: NURSE PRACTITIONER | Admitting: NURSE PRACTITIONER
Payer: COMMERCIAL

## 2020-06-09 PROCEDURE — 97803 MED NUTRITION INDIV SUBSEQ: CPT | Mod: TEL | Performed by: DIETITIAN, REGISTERED

## 2020-06-10 NOTE — PROGRESS NOTES
"[place] [facility]  Medical Nutrition Therapy    Visit Type: Reassessment    Visit conducted via telephone. Pt preferred telephone vs video.     Deneen Hyde referred by Leann MATUTE CNP for MNT related to Weight Gain     Nutrition Assessment:  Anthropometrics  Height: 5' 2\"    BMI: 26.6  (overweight range)      Weight: 146.6 lb/66.5 kg    BSA: 1.69 m2      IBW (kg): 110 lb/50 kg                  Wt Readings from Last 10 Encounters:   01/15/20 66.5 kg (146 lb 9.6 oz)   12/10/19 65.3 kg (144 lb)   08/10/18 60.9 kg (134 lb 4.8 oz)   06/12/18 59 kg (130 lb)   05/22/18 59 kg (130 lb)   03/14/18 60.1 kg (132 lb 9.6 oz)   01/04/18 60.4 kg (133 lb 3.2 oz)   12/21/17 59.8 kg (131 lb 12.8 oz)   11/30/17 60.4 kg (133 lb 3.2 oz)   -No new wt since January.     Nutrition History  -Last visit with pt was 2/20/20. Nutrition goals set at that time were the following  1) Follow meal plan created  -Met. Pt followed meal plan exactly as prescribed.   2) Exercise for 45-60 min 3x/week doing classes at the [facility]  -Not met. Pt did not start exercising.     -Pt reports that she is hungry all the time and not losing weight, which is frustrating. She is still working her OEA program, and has celebrated 8 years with the program. She is requesting to add a couple snacks to her meal plan to satisfy this hunger  -Pt states that it's very important for her to stick to her meal plan exactly and to not allow any flexibility, otherwise she may go back to her old eating habits. This is what her program is teaching her and she want to abide by these rules. She is not interested in learning intuitive eating or about making peace with food.     Physical Activity  -None. Pt reports she has been very sedentary and knows she needs to improve on this.     Nutrition Prescription  Energy: 7335-1284 kcal/day  (25-30 kcal/kg)  -Recommend 1,400 kcal/day meal plan for wt loss    Protein: 43-54 g/day  (0.8-1 g/kg)  7 exchanges          " Fluid: 1,620 mL/day         Fat:   4 exchanges       Carbohydrate:   11 exchanges   (3 milk, 2 fruit, 2 starchy veggies, 4 grains)    Fiber: 25 g/day               Non-starchy Veggies:  3 exchanges          -Pt prefers to follow this style of food plan due to her hx of over-eating disorder.     Food Record  B: 2 P, 1 G, 1 M, 1 Fruit, 1 F  -Latte w/skim milk OR 1 oz cheese OR almond milk with a hardboiled egg, breakfast loaf (homemade- 28 g oats or 28 g GF flour), 4oz mixed fruit, 1 oz flax seed OR 1oz olive oil  L: 2 P, 1 G, 2 NSV, 1 SV, 1 M, 1 Fruit, 1 F  -1 oz cheese, 1 oz chicken or turkey , 1/2 black beans, WG bread or crackers, fruit & almond milk smoothie, avocado or hummus, almonds and flax seed  D: 2 P, 1 G, 1 NSV, 1 SV, 1 M, 2 F  -1 oz cheese, turkey or chicken, veggies, almond milk,   Snack: dairy or grain OR protein and starch such as oatmeal and milk OR bread and nut butter  Snack: 1 P, 1 G   -Fluids: 24 oz water     -Pt follows this meal plan very rigidly, which she says is important for her OEA program. She calls her sponsor daily to go over her food plan for the day and if she wants to make a change to it, she needs to call her sponsor for permission first.     Nutrition Diagnosis:  Unintended weight gain r/t sedentary lifestyle/excessive oral intake aeb pt report of low daily physical activity and increasing daily caloric intake and wt gain of 12 lb over the past year    Nutrition Intervention:  -Discussed goals set at last meeting and barriers towards reaching them.   -Educated pt on metabolism and used the fire analogy. Encouraged her to eat when she's hungry, as her body is telling her she needs nourishment. Highly suggested she adds in the two snacks she requested to her meal plan. Suggested doing veggies and hummus and raisins and almonds as her two snacks.   -Reviewed her prescribed exchanges and discussed how the meal plan meets her calorie needs.   -Encouraged pt to drink more water, as she  is only consuming about 1/2 her needs at this time.   -Discussed the importance of PA and it's role in weight loss. Encouraged her to start working out at the [employer] again, now that it's open. Pt is excited about this and plans to bring her teenage daughters with. She also would like to start walking her dog again.  -Encouraged pt to continue following her OEA program, since she is enjoying it and states that it works well for her.     Handouts emailed to pt: Meal Exchange List, Meal Planning Calendar    Nutrition Goals:  1) Start exercising 3 days per week. Either walk the dog for 20 min or go to a [employer] class for 45 min.   2) Follow prescribed food plan (two snacks added):  B: 2 P, 1 G, 1 M, 1 Fruit, 1 F  Snack: 1 F, 1 Fruit  L: 2 P, 1 G, 2 NSV, 1 SV, 1 M, 1 Fruit, 1 F  Snack: 1 NSV, 1 F  D: 2 P, 1 G, 1 NSV, 1 SV, 1 M, 2 F  Snack: 1 P, 1 G    Nutrition Follow Up / Monitoring:  PO intake, weight, PA    Nutrition Recommendations:  Patient to follow-up with RD in 4-5 weeks. Apt scheduled for 7/14  Patient has RD contact information to call/email if needed.    Start Time: 14:15  End Time: 15: 04    Isela Warner RD, LD  Clinical Dietitian  [facility]: [phone]  [facility]: [phone]

## 2020-07-14 ENCOUNTER — HOSPITAL ENCOUNTER (OUTPATIENT)
Dept: NUTRITION | Facility: CLINIC | Age: 50
Discharge: HOME | End: 2020-07-14
Attending: NURSE PRACTITIONER | Admitting: NURSE PRACTITIONER
Payer: COMMERCIAL

## 2020-07-14 VITALS — BODY MASS INDEX: 26.16 KG/M2 | WEIGHT: 143 LBS

## 2020-07-14 PROCEDURE — 97802 MEDICAL NUTRITION INDIV IN: CPT | Mod: TEL | Performed by: DIETITIAN, REGISTERED

## 2020-07-14 PROCEDURE — 97803 MED NUTRITION INDIV SUBSEQ: CPT | Mod: TEL | Performed by: DIETITIAN, REGISTERED

## 2020-07-14 NOTE — PROGRESS NOTES
"[place] [facility]  Medical Nutrition Therapy    Visit Type: Reassessment    Deneen Hyde referred by Leann MATUTE CNP for MNT related to Weight Gain  Deneen Hyde is a 49 year old female who is being evaluated via a billable telephone visit.      The patient has been notified of following:     \"This telephone visit will be conducted via a call between you and your physician/provider. We have found that certain health care needs can be provided without the need for a physical exam.  This service lets us provide the care you need with a short phone conversation.  If a prescription is necessary we can send it directly to your pharmacy.  If lab work is needed we can place an order for that and you can then stop by our lab to have the test done at a later time.    Telephone visits are billed at different rates depending on your insurance coverage. During this emergency period, for some insurers they may be billed the same as an in-person visit.  Please reach out to your insurance provider with any questions.    If during the course of the call the physician/provider feels a telephone visit is not appropriate, you will not be charged for this service.\"    Patient has given verbal consent for Telephone visit?  Yes    What phone number would you like to be contacted at? [phone]    How would you like to obtain your AVS? [id number]    Phone call duration: 20 minutes    Isela Warner RD      Nutrition Assessment:  Anthropometrics  Height: 5' 2\"    BMI:  26.2  (overweight range)      Weight: 65 kg/143 lb    BSA: 1.68 m2      IBW (kg): 50 kg/110 lb          Wt Readings from Last 10 Encounters:   07/14/20 64.9 kg (143 lb)   01/15/20 66.5 kg (146 lb 9.6 oz)   12/10/19 65.3 kg (144 lb)   08/10/18 60.9 kg (134 lb 4.8 oz)   06/12/18 59 kg (130 lb)   05/22/18 59 kg (130 lb)   03/14/18 60.1 kg (132 lb 9.6 oz)   01/04/18 60.4 kg (133 lb 3.2 oz)   12/21/17 59.8 kg (131 lb 12.8 oz)   11/30/17 60.4 kg (133 lb " 3.2 oz)   -Pt has lost 3 lb over the past 6 months.     Nutrition History  Last visit w/pt was on 20. Nutrition goals set at that time were the followin) Start exercising 3 days per week. Either walk the dog for 20 min or go to a [employer] class for 45 min.   -Met. Pt has been walking the dogs for 30 min 3 days per week on average.   2) Follow prescribed food plan (two snacks added):  B: 2 P, 1 G, 1 M, 1 Fruit, 1 F  Snack: 1 F, 1 Fruit  L: 2 P, 1 G, 2 NSV, 1 SV, 1 M, 1 Fruit, 1 F  Snack: 1 NSV, 1 F  D: 2 P, 1 G, 1 NSV, 1 SV, 1 M, 2 F  Snack: 1 P, 1 G   -Met. Pt has been following this food plan exactly.   -Pt has been working to increase her water intake over the past month and now carries a water bottle with her as a reminder to keep drinking throughout the day.   -Pt reports that over the  weekend she didn't choose the healthiest food items to fill her meal plan, but she did stick to her exchanges. Pt still enjoys following this sort of meal plan r/t her hx of OED.  -Pt has been continuing to attend OEA meetings and visits with her sponsor daily. She is happy in her program.  -She is interested in doing more outdoor activities with her teenage daughters and is looking into getting roller skis to try with them.     Physical Activity  -Walks the dogs for 30 min three days per week.   -Her job is very sedentary and she works from home     Nutrition Prescription  Energy: 9681-5837 kcal/day  (25-30 kcal/kg)  -Recommend 1,400 kcal/day meal plan for wt loss    Protein: 43-54 g/day  (0.8-1 g/kg)  7 exchanges          Fluid: 1,620 mL/day         Fat:   4 exchanges       Carbohydrate:   11 exchanges   (3 milk, 2 fruit, 2 starchy veggies, 4 grains)    Fiber: 25 g/day               Non-starchy Veggies:  3 exchanges             Food Record  B: 2 P, 1 G, 1 M, 1 Fruit, 1 F  -Latte w/skim milk OR 1 oz cheese OR almond milk with a hardboiled egg, breakfast loaf (homemade- 28 g oats or 28 g GF flour), 4oz mixed  fruit, 1 oz flax seed OR 1oz olive oil  Snack: 1 F, 1 Fruit  -Raisins and almonds  L: 2 P, 1 G, 2 NSV, 1 SV, 1 M, 1 Fruit, 1 F  -1 oz cheese, 1 oz chicken or turkey , 1/2 black beans, WG bread or crackers, fruit & almond milk smoothie, avocado or hummus, almonds and flax seed  Snack: 1 NSV, 1 F  -Veggies and hummus  D: 2 P, 1 G, 1 NSV, 1 SV, 1 M, 2 F  -1 oz cheese, turkey or chicken, veggies, almond milk,   Snack: 1 P, 1 G   -Fluids: 53 oz water    Nutrition Diagnosis:  Unintended weight gain r/t sedentary lifestyle/hx of excessive oral intake aeb pt report of low daily physical activity and increased daily caloric intake and wt gain of 9 lb over the past year    Nutrition Intervention:  -Discussed goals set at last visit and commended her for reaching them.  -Encouraged her to continue working her program and to continue following her meal plan.  -Discussed how the additional snacks in her meal plan have helped control her hunger and maintain satiety longer. Pt is happy with this addition.   -Discussed ways to increase physical activity and recommended that she increase her activity to 30 min five days per week and to have an ultimate goal of 60 min five days per week for weight loss.      Nutrition Goals:  1) Exercise for 30 min five days per week  2) Continue following your meal plan using the exchange system:  B: 2 P, 1 G, 1 M, 1 Fruit, 1 F  Snack: 1 F, 1 Fruit  L: 2 P, 1 G, 2 NSV, 1 SV, 1 M, 1 Fruit, 1 F  Snack: 1 NSV, 1 F  D: 2 P, 1 G, 1 NSV, 1 SV, 1 M, 2 F  Snack: 1 P, 1 G    Nutrition Follow Up / Monitoring:  Weight, PO intake, PA     Nutrition Recommendations:  -Pt is doing very well following nutrition recommendations and has a good knowledge base of healthy food choices. I feel that she does not need close monitoring at this time and she can call if any questions arise or she has any other nutrition needs. Pt is happy with this plan.       Patient to follow-up with RD as needed.   Patient has RD contact  information to call/email if needed.      Start Time: 14:00  End Time: 14:20    Isela Warner RD, SAV  Clinical Dietitian  [facility]: [phone]  [facility]: [phone]

## 2020-07-20 ENCOUNTER — OFFICE VISIT (OUTPATIENT)
Dept: URGENT CARE | Facility: URGENT CARE | Age: 50
End: 2020-07-20
Payer: COMMERCIAL

## 2020-07-20 VITALS
OXYGEN SATURATION: 100 % | TEMPERATURE: 96.9 F | SYSTOLIC BLOOD PRESSURE: 110 MMHG | HEART RATE: 67 BPM | RESPIRATION RATE: 12 BRPM | DIASTOLIC BLOOD PRESSURE: 58 MMHG

## 2020-07-20 DIAGNOSIS — T78.40XA ALLERGIC REACTION, INITIAL ENCOUNTER: Primary | ICD-10-CM

## 2020-07-20 PROCEDURE — 99214 OFFICE O/P EST MOD 30 MIN: CPT | Performed by: PHYSICIAN ASSISTANT

## 2020-07-20 RX ORDER — CETIRIZINE HYDROCHLORIDE 10 MG/1
10 TABLET ORAL ONCE
Status: COMPLETED | OUTPATIENT
Start: 2020-07-20 | End: 2020-07-20

## 2020-07-20 RX ORDER — PREDNISONE 20 MG/1
40 TABLET ORAL DAILY
Qty: 10 TABLET | Refills: 0 | Status: SHIPPED | OUTPATIENT
Start: 2020-07-20 | End: 2020-07-25

## 2020-07-20 RX ADMIN — CETIRIZINE HYDROCHLORIDE 10 MG: 10 TABLET ORAL at 19:27

## 2020-07-20 ASSESSMENT — ENCOUNTER SYMPTOMS
EYE REDNESS: 0
RHINORRHEA: 0
COUGH: 0
WHEEZING: 0
BACK PAIN: 0
PALPITATIONS: 0
VOMITING: 0
SORE THROAT: 0
FEVER: 0
SINUS PRESSURE: 0
CHILLS: 0
MYALGIAS: 0
ABDOMINAL PAIN: 0
TROUBLE SWALLOWING: 0
EYE PAIN: 0
SHORTNESS OF BREATH: 0
UNEXPECTED WEIGHT CHANGE: 0
FATIGUE: 0
CHEST TIGHTNESS: 0
DIARRHEA: 0
ARTHRALGIAS: 0
NAUSEA: 0

## 2020-07-21 ASSESSMENT — ENCOUNTER SYMPTOMS: ENDOCRINE NEGATIVE: 1

## 2020-07-21 NOTE — PROGRESS NOTES
SUBJECTIVE:   Deneen Hyde is a 49 year old female presenting with a chief complaint of   Chief Complaint   Patient presents with     Allergic Reaction     Is allergic to Tomatoes, and ate something with tomato sauce.  Took 2 Benadryl.  A little itchy though nothing severe and no trouble breathing        She is an established patient of [facility].    [facility] Adult    Onset of symptoms was 1 hour(s) ago.  Course of illness is same.    Severity moderate  Current and Associated symptoms: itchy chest, swelling in ears, difficulty breathing which has improved.   Treatment measures tried include 2 benadryl.  Predisposing factors include patient reports history of anaphylactic reaction with tomatoes in the past, .    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 She had a bite of mexican food that had a red tomoatoe sauce. She immediately took 2 benadryl and came to urgent care. She reports itching on her chest and ears. These symptoms are consistent with her anaphylactic reaction in the past. No difficulty breathing at this time.         Review of Systems   Constitutional: Negative for chills, fatigue, fever and unexpected weight change.   HENT: Negative for congestion, ear pain, postnasal drip, rhinorrhea, sinus pressure, sore throat and trouble swallowing.    Eyes: Negative for pain, redness and visual disturbance.   Respiratory: Negative for cough, chest tightness, shortness of breath and wheezing.    Cardiovascular: Negative for chest pain and palpitations.   Gastrointestinal: Negative for abdominal pain, diarrhea, nausea and vomiting.   Endocrine: Negative.    Musculoskeletal: Negative for arthralgias, back pain and myalgias.   Skin: Negative for rash.   Allergic/Immunologic:        Allergic reaction to tomatoes       History reviewed. No pertinent past medical history.  Family History   Problem Relation Age of Onset     Thyroid Disease Mother      Suicide Father      Suicide Sister       Thyroid Disease Sister      Depression Daughter      Current Outpatient Medications   Medication Sig Dispense Refill     acetaminophen (TYLENOL) 500 MG tablet Take 1,000 mg by mouth every 6 hours as needed for mild pain       B Complex-C (VITAMIN B COMPLEX W/VITAMIN C) TABS tablet Take 1 tablet by mouth daily       CALCIUM PO 1 tablet daily when she remembers , unknown dose       cetirizine (ZYRTEC) 10 MG tablet Take 10 mg by mouth daily       EPINEPHrine (EPIPEN/ADRENACLICK/OR ANY BX GENERIC EQUIV) 0.3 MG/0.3ML injection 2-pack Inject 0.3 mLs (0.3 mg) into the muscle as needed for anaphylaxis 1 each 0     ibuprofen (ADVIL/MOTRIN) 200 MG tablet Take 600 mg by mouth every 6 hours as needed for mild pain       melatonin 1 MG CAPS Take 7.5 mg by mouth       Multiple Vitamins-Minerals (MULTIVITAMIN PO)        predniSONE (DELTASONE) 20 MG tablet Take 2 tablets (40 mg) by mouth daily for 5 days 10 tablet 0     Probiotic Product (PRO-BIOTIC BLEND PO)        HYDROcodone-acetaminophen (NORCO) 5-325 MG per tablet Take 1-2 tablets by mouth every 6 hours as needed for pain (Patient not taking: Reported on 8/10/2018) 60 tablet 0     Social History     Tobacco Use     Smoking status: Former Smoker     Last attempt to quit: 2007     Years since quittin.6     Smokeless tobacco: Never Used   Substance Use Topics     Alcohol use: No       OBJECTIVE  /58 (BP Location: Right arm, Patient Position: Chair, Cuff Size: Adult Regular)   Pulse 67   Temp 96.9  F (36.1  C) (Tympanic)   Resp 12   SpO2 100%     Physical Exam  Constitutional:       Appearance: She is well-developed.   HENT:      Head: Normocephalic.      Comments: Face is flushed on arrival but improved after observation. No swelling of the lips or tongue. Airway is clear.      Right Ear: Tympanic membrane and ear canal normal.      Left Ear: Tympanic membrane and ear canal normal.   Eyes:      Conjunctiva/sclera: Conjunctivae normal.      Pupils: Pupils are  equal, round, and reactive to light.   Cardiovascular:      Rate and Rhythm: Normal rate.      Heart sounds: Normal heart sounds.   Pulmonary:      Effort: Pulmonary effort is normal.      Breath sounds: Normal breath sounds.      Comments: No difficulty breathing   Skin:     General: Skin is warm and dry.      Findings: No rash.   Psychiatric:         Behavior: Behavior normal.         Labs:  No results found for this or any previous visit (from the past 24 hour(s)).    X-Ray was not done.    ASSESSMENT:      ICD-10-CM    1. Allergic reaction, initial encounter  T78.40XA cetirizine (zyrTEC) tablet 10 mg     predniSONE (DELTASONE) 20 MG tablet        Medical Decision Making:    Differential Diagnosis:  Allergic reaction, anaphylaxis     Serious Comorbid Conditions:  Adult:  None    PLAN:  Patient took benadryl prior to arrival. Zyrtec 10mg was given to her in clinic. Patient was observed for over 1 hour and vital signs and exam was normal. Patient felt her symptoms improve and felt ok to go home. Prescribed prednisone 40mg x 5 days that she can take if needed. She has Epipen in her purse and understands how to use it. Discussed in detail symptoms that would warrant emergent evaluation in the ED. Patient agrees with plan and will follow up as needed.      Followup:    If not improving or if conditions worsens over the next 12-24 hours, go to the Emergency Department    There are no Patient Instructions on file for this visit.

## 2020-07-21 NOTE — NURSING NOTE
Clinic Administered Medication Documentation    Oral Medication Documentation    Patient was given Cetirizine 10 mg tablet. Prior to medication administration, verified patients identity using patient s name and date of birth. Please see MAR and medication order for additional information.     Was entire amount of medication used? Yes  Time: 07:27 PM  Expiration Date: 04/2021  NDC: 9896-6327-27  Lot: [id number]    Ok Tompkins M.A.

## 2020-12-27 ENCOUNTER — HEALTH MAINTENANCE LETTER (OUTPATIENT)
Age: 50
End: 2020-12-27

## 2021-01-15 ENCOUNTER — HEALTH MAINTENANCE LETTER (OUTPATIENT)
Age: 51
End: 2021-01-15

## 2021-04-25 ENCOUNTER — HEALTH MAINTENANCE LETTER (OUTPATIENT)
Age: 51
End: 2021-04-25

## 2021-08-12 ENCOUNTER — IMMUNIZATION (OUTPATIENT)
Dept: FAMILY MEDICINE | Facility: CLINIC | Age: 51
End: 2021-08-12
Payer: COMMERCIAL

## 2021-08-12 PROCEDURE — 0001A PR COVID VAC PFIZER DIL RECON 30 MCG/0.3 ML IM: CPT

## 2021-08-12 PROCEDURE — 91300 PR COVID VAC PFIZER DIL RECON 30 MCG/0.3 ML IM: CPT

## 2021-09-02 ENCOUNTER — IMMUNIZATION (OUTPATIENT)
Dept: FAMILY MEDICINE | Facility: CLINIC | Age: 51
End: 2021-09-02
Attending: FAMILY MEDICINE
Payer: COMMERCIAL

## 2021-09-02 PROCEDURE — 0002A PR COVID VAC PFIZER DIL RECON 30 MCG/0.3 ML IM: CPT

## 2021-09-02 PROCEDURE — 91300 PR COVID VAC PFIZER DIL RECON 30 MCG/0.3 ML IM: CPT

## 2021-10-09 ENCOUNTER — HEALTH MAINTENANCE LETTER (OUTPATIENT)
Age: 51
End: 2021-10-09

## 2021-11-09 NOTE — PROGRESS NOTES
SUBJECTIVE:   CC: Deneen Hyde is an 51 year old woman who presents for preventive health visit.       Patient has been advised of split billing requirements and indicates understanding: Yes  Healthy Habits:     Getting at least 3 servings of Calcium per day:  Yes    Bi-annual eye exam:  Yes    Dental care twice a year:  Yes    Sleep apnea or symptoms of sleep apnea:  Daytime drowsiness    Diet:  Diabetic and Other    Frequency of exercise:  None    Taking medications regularly:  Yes    Medication side effects:  None    PHQ-2 Total Score: 0    Additional concerns today:  No          Chief Complaint   Patient presents with     Physical     General physical exam.  Mammogram is scheduled in December.     Blood Draw     Patient has had coffee this morning.     Colonoscopy     She is due for a colonoscopy.     Imm/Inj     Due to update her tetanus injection. Declined the flu shot. Discuss about the shingles vaccine.  Did mention she should check with her insurance about coverage for that injection.         Today's PHQ-2 Score:   PHQ-2 (  [employer]) 2021   Q1: Little interest or pleasure in doing things 0   Q2: Feeling down, depressed or hopeless 0   PHQ-2 Score 0   Q1: Little interest or pleasure in doing things Not at all   Q2: Feeling down, depressed or hopeless Not at all   PHQ-2 Score 0       Abuse: Current or Past (Physical, Sexual or Emotional) - No  Do you feel safe in your environment? Yes    Have you ever done Advance Care Planning? (For example, a Health Directive, POLST, or a discussion with a medical provider or your loved ones about your wishes): Will print out paper for patient.     Social History     Tobacco Use     Smoking status: Former Smoker     Quit date: 1998     Years since quittin.0     Smokeless tobacco: Never Used   Substance Use Topics     Alcohol use: No     If you drink alcohol do you typically have >3 drinks per day or >7 drinks per week? No    Alcohol Use  11/11/2021   Prescreen: >3 drinks/day or >7 drinks/week? Not Applicable   Prescreen: >3 drinks/day or >7 drinks/week? -       Reviewed orders with patient.  Reviewed health maintenance and updated orders accordingly - Yes  BP Readings from Last 3 Encounters:   11/11/21 92/70   07/20/20 110/58   12/10/19 92/64    Wt Readings from Last 3 Encounters:   11/11/21 66.2 kg (146 lb)   07/14/20 64.9 kg (143 lb)   01/15/20 66.5 kg (146 lb 9.6 oz)                    Breast Cancer Screening:  Any new diagnosis of family breast, ovarian, or bowel cancer? No    FHS-7: No flowsheet data found.    Mammogram Screening: Recommended annual mammography  Pertinent mammograms are reviewed under the imaging tab.    History of abnormal Pap smear: Status post benign hysterectomy. Health Maintenance and Surgical History updated.     Reviewed and updated as needed this visit by clinical staff  Tobacco  Allergies  Meds   Med Hx  Surg Hx  Fam Hx  Soc Hx       Reviewed and updated as needed this visit by Provider                   Review of Systems   Constitutional: Negative for chills and fever.   HENT: Negative for congestion, ear pain, hearing loss and sore throat.    Eyes: Negative for pain and visual disturbance.   Respiratory: Negative for cough and shortness of breath.    Cardiovascular: Negative for chest pain, palpitations and peripheral edema.   Gastrointestinal: Negative for abdominal pain, constipation, diarrhea, heartburn, hematochezia and nausea.   Breasts:  Negative for tenderness, breast mass and discharge.   Genitourinary: Negative for dysuria, frequency, genital sores, hematuria, pelvic pain, urgency, vaginal bleeding and vaginal discharge.   Musculoskeletal: Positive for arthralgias. Negative for joint swelling and myalgias.   Skin: Negative for rash.   Neurological: Positive for weakness. Negative for dizziness, headaches and paresthesias.   Psychiatric/Behavioral: Positive for mood changes. The patient is  "nervous/anxious.      CONSTITUTIONAL: NEGATIVE for fever, chills, change in weight  INTEGUMENTARY/SKIN: NEGATIVE for worrisome rashes, moles or lesions  EYES: NEGATIVE for vision changes or irritation  ENT: NEGATIVE for ear, mouth and throat problems  RESP: NEGATIVE for significant cough or SOB  BREAST: NEGATIVE for masses, tenderness or discharge  CV: NEGATIVE for chest pain, palpitations or peripheral edema  GI: NEGATIVE for nausea, abdominal pain, heartburn, or change in bowel habits  : NEGATIVE for unusual urinary or vaginal symptoms. No vaginal bleeding.  MUSCULOSKELETAL: NEGATIVE for significant arthralgias or myalgia  NEURO: NEGATIVE for weakness, dizziness or paresthesias  PSYCHIATRIC: NEGATIVE for changes in mood or affect      OBJECTIVE:   BP 92/70   Pulse 66   Temp 98.1  F (36.7  C) (Tympanic)   Resp 16   Ht 1.581 m (5' 2.25\")   Wt 66.2 kg (146 lb)   SpO2 98%   BMI 26.49 kg/m    Physical Exam  GENERAL: healthy, alert and no distress  EYES: Eyes grossly normal to inspection, PERRL and conjunctivae and sclerae normal  HENT: ear canals and TM's normal, nose and mouth without ulcers or lesions  NECK: no adenopathy, no asymmetry, masses, or scars and thyroid normal to palpation  RESP: lungs clear to auscultation - no rales, rhonchi or wheezes  CV: regular rate and rhythm, normal S1 S2, no S3 or S4, no murmur, click or rub, no peripheral edema and peripheral pulses strong  ABDOMEN: soft, nontender, no hepatosplenomegaly, no masses and bowel sounds normal  MS: no gross musculoskeletal defects noted, no edema  SKIN: no suspicious lesions or rashes  NEURO: Normal strength and tone, mentation intact and speech normal  PSYCH: mentation appears normal, affect normal/bright    Diagnostic Test Results:  Labs reviewed in Epic    ASSESSMENT/PLAN:   (Z00.00) Routine general medical examination at a health care facility  (primary encounter diagnosis)  Comment:   Plan:     (Z12.11) Special screening for malignant " "neoplasms, colon  Comment:   Plan: Adult Gastro Ref - Procedure Only            (Z11.4) Screening for HIV (human immunodeficiency virus)  Comment:   Plan: HIV Antigen Antibody Combo            (Z11.59) Encounter for hepatitis C screening test for low risk patient  Comment:   Plan: Hepatitis C antibody            (Z13.1) Screening for diabetes mellitus  Comment:   Plan: Glucose            (Z91.018) Food allergy  Comment:   Plan: EPINEPHrine (ANY BX GENERIC EQUIV) 0.3 MG/0.3ML        injection 2-pack, Adult Allergy/Asthma Referral            (Z23) Encounter for immunization  Comment:   Plan: TDAP VACCINE (Adacel, Boostrix)  [[id number]]              Patient has been advised of split billing requirements and indicates understanding: Yes  COUNSELING:  Reviewed preventive health counseling, as reflected in patient instructions       Regular exercise       Healthy diet/nutrition    Estimated body mass index is 26.49 kg/m  as calculated from the following:    Height as of this encounter: 1.581 m (5' 2.25\").    Weight as of this encounter: 66.2 kg (146 lb).    Weight management plan: Discussed healthy diet and exercise guidelines    She reports that she quit smoking about 23 years ago. She has never used smokeless tobacco.      Counseling Resources:  ATP IV Guidelines  Pooled Cohorts Equation Calculator  Breast Cancer Risk Calculator  BRCA-Related Cancer Risk Assessment: FHS-7 Tool  FRAX Risk Assessment  ICSI Preventive Guidelines  Dietary Guidelines for Americans, 2010  USDA's MyPlate  ASA Prophylaxis  Lung CA Screening    GIOVANI Delong CNP  [facility]  "

## 2021-11-09 NOTE — PATIENT INSTRUCTIONS
Preventive Health Recommendations  Female Ages 50 - 64    Yearly exam: See your health care provider every year in order to  o Review health changes.   o Discuss preventive care.    o Review your medicines if your doctor has prescribed any.      Get a Pap test every three years (unless you have an abnormal result and your provider advises testing more often).    If you get Pap tests with HPV test, you only need to test every 5 years, unless you have an abnormal result.     You do not need a Pap test if your uterus was removed (hysterectomy) and you have not had cancer.    You should be tested each year for STDs (sexually transmitted diseases) if you're at risk.     Have a mammogram every 1 to 2 years.    Have a colonoscopy at age 50, or have a yearly FIT test (stool test). These exams screen for colon cancer.      Have a cholesterol test every 5 years, or more often if advised.    Have a diabetes test (fasting glucose) every three years. If you are at risk for diabetes, you should have this test more often.     If you are at risk for osteoporosis (brittle bone disease), think about having a bone density scan (DEXA).    Shots: Get a flu shot each year. Get a tetanus shot every 10 years.    Nutrition:     Eat at least 5 servings of fruits and vegetables each day.    Eat whole-grain bread, whole-wheat pasta and brown rice instead of white grains and rice.    Get adequate Calcium and Vitamin D. ( 1500 mg of calcium and 1000 units of Vitamin D)    Lifestyle    Exercise at least 150 minutes a week (30 minutes a day, 5 days a week). This will help you control your weight and prevent disease.    Limit alcohol to one drink per day.    No smoking.     Wear sunscreen to prevent skin cancer.     See your dentist every six months for an exam and cleaning.    See your eye doctor every 1 to 2 years.          Thank you for choosing [facility].  You may be receiving an email and/or telephone survey request from [facility] [employer] regarding your visit today.  Please take a few minutes to respond to the survey to let us know how we are doing.      If you have questions or concerns, please contact us via [employer] or you can contact your care team at [phone] option [phone].    Our Clinic hours are:  Monday - Thursday 7am-6pm  Friday 7am-5pm    The [place] outpatient lab hours are:  Monday - Friday 7am-4:30pm    Appointments are required, call [phone]    If you have clinical questions after hours or would like to schedule an appointment,  call the clinic at [phone].

## 2021-11-11 ENCOUNTER — OFFICE VISIT (OUTPATIENT)
Dept: FAMILY MEDICINE | Facility: CLINIC | Age: 51
End: 2021-11-11
Payer: COMMERCIAL

## 2021-11-11 VITALS
HEIGHT: 62 IN | TEMPERATURE: 98.1 F | OXYGEN SATURATION: 98 % | BODY MASS INDEX: 26.87 KG/M2 | WEIGHT: 146 LBS | SYSTOLIC BLOOD PRESSURE: 92 MMHG | RESPIRATION RATE: 16 BRPM | DIASTOLIC BLOOD PRESSURE: 70 MMHG | HEART RATE: 66 BPM

## 2021-11-11 DIAGNOSIS — Z11.4 SCREENING FOR HIV (HUMAN IMMUNODEFICIENCY VIRUS): ICD-10-CM

## 2021-11-11 DIAGNOSIS — Z91.018 FOOD ALLERGY: ICD-10-CM

## 2021-11-11 DIAGNOSIS — Z13.1 SCREENING FOR DIABETES MELLITUS: ICD-10-CM

## 2021-11-11 DIAGNOSIS — Z00.00 ROUTINE GENERAL MEDICAL EXAMINATION AT A HEALTH CARE FACILITY: Primary | ICD-10-CM

## 2021-11-11 DIAGNOSIS — Z23 ENCOUNTER FOR IMMUNIZATION: ICD-10-CM

## 2021-11-11 DIAGNOSIS — Z11.59 ENCOUNTER FOR HEPATITIS C SCREENING TEST FOR LOW RISK PATIENT: ICD-10-CM

## 2021-11-11 DIAGNOSIS — Z12.11 SPECIAL SCREENING FOR MALIGNANT NEOPLASMS, COLON: ICD-10-CM

## 2021-11-11 LAB
FASTING STATUS PATIENT QL REPORTED: YES
GLUCOSE BLD-MCNC: 79 MG/DL (ref 70–99)
HCV AB SERPL QL IA: NONREACTIVE
HIV 1+2 AB+HIV1 P24 AG SERPL QL IA: NONREACTIVE

## 2021-11-11 PROCEDURE — 90471 IMMUNIZATION ADMIN: CPT | Performed by: NURSE PRACTITIONER

## 2021-11-11 PROCEDURE — 87389 HIV-1 AG W/HIV-1&-2 AB AG IA: CPT | Performed by: NURSE PRACTITIONER

## 2021-11-11 PROCEDURE — 36415 COLL VENOUS BLD VENIPUNCTURE: CPT | Performed by: NURSE PRACTITIONER

## 2021-11-11 PROCEDURE — 90715 TDAP VACCINE 7 YRS/> IM: CPT | Performed by: NURSE PRACTITIONER

## 2021-11-11 PROCEDURE — 99396 PREV VISIT EST AGE 40-64: CPT | Mod: 25 | Performed by: NURSE PRACTITIONER

## 2021-11-11 PROCEDURE — 82947 ASSAY GLUCOSE BLOOD QUANT: CPT | Performed by: NURSE PRACTITIONER

## 2021-11-11 PROCEDURE — 86803 HEPATITIS C AB TEST: CPT | Performed by: NURSE PRACTITIONER

## 2021-11-11 RX ORDER — EPINEPHRINE 0.3 MG/.3ML
0.3 INJECTION SUBCUTANEOUS ONCE
Qty: 1 EACH | Refills: 0 | Status: SHIPPED | OUTPATIENT
Start: 2021-11-11 | End: 2021-11-11

## 2021-11-11 RX ORDER — METAPROTERENOL SULFATE 10 MG
500 TABLET ORAL DAILY
COMMUNITY
Start: 2021-11-11

## 2021-11-11 ASSESSMENT — ENCOUNTER SYMPTOMS
ABDOMINAL PAIN: 0
NAUSEA: 0
HEMATURIA: 0
ARTHRALGIAS: 1
WEAKNESS: 1
BREAST MASS: 0
SORE THROAT: 0
HEADACHES: 0
PALPITATIONS: 0
FEVER: 0
DIARRHEA: 0
FREQUENCY: 0
JOINT SWELLING: 0
SHORTNESS OF BREATH: 0
HEARTBURN: 0
PARESTHESIAS: 0
CHILLS: 0
NERVOUS/ANXIOUS: 1
DIZZINESS: 0
DYSURIA: 0
COUGH: 0
CONSTIPATION: 0
HEMATOCHEZIA: 0
MYALGIAS: 0
EYE PAIN: 0

## 2021-11-11 ASSESSMENT — MIFFLIN-ST. JEOR: SCORE: 1234.47

## 2021-11-11 NOTE — PROGRESS NOTES
Prior to immunization administration, verified patients identity using patient s name and date of birth. Please see Immunization Activity for additional information.     Screening Questionnaire for Adult Immunization    Are you sick today?   No   Do you have allergies to medications, food, a vaccine component or latex?   Yes   Have you ever had a serious reaction after receiving a vaccination?   No   Do you have a long-term health problem with heart, lung, kidney, or metabolic disease (e.g., diabetes), asthma, a blood disorder, no spleen, complement component deficiency, a cochlear implant, or a spinal fluid leak?  Are you on long-term aspirin therapy?   No   Do you have cancer, leukemia, HIV/AIDS, or any other immune system problem?   No   Do you have a parent, brother, or sister with an immune system problem?   No   In the past 3 months, have you taken medications that affect  your immune system, such as prednisone, other steroids, or anticancer drugs; drugs for the treatment of rheumatoid arthritis, Crohn s disease, or psoriasis; or have you had radiation treatments?   No   Have you had a seizure, or a brain or other nervous system problem?   No   During the past year, have you received a transfusion of blood or blood    products, or been given immune (gamma) globulin or antiviral drug?   No   For women: Are you pregnant or is there a chance you could become       pregnant during the next month?   No   Have you received any vaccinations in the past 4 weeks?   No     Immunization questionnaire answers were all negative.        Per orders of Leann Cisneros, injection of Adacel given by Lori Blank CMA. Patient instructed to remain in clinic for 15 minutes afterwards, and to report any adverse reaction to me immediately.       Screening performed by Lori Blank CMA on 11/11/2021 at 8:02 AM.

## 2022-01-13 DIAGNOSIS — Z11.59 ENCOUNTER FOR SCREENING FOR OTHER VIRAL DISEASES: Primary | ICD-10-CM

## 2022-01-29 ENCOUNTER — HEALTH MAINTENANCE LETTER (OUTPATIENT)
Age: 52
End: 2022-01-29

## 2022-02-04 ENCOUNTER — HOSPITAL ENCOUNTER (OUTPATIENT)
Dept: MAMMOGRAPHY | Facility: CLINIC | Age: 52
Discharge: HOME | End: 2022-02-04
Attending: NURSE PRACTITIONER | Admitting: NURSE PRACTITIONER
Payer: COMMERCIAL

## 2022-02-04 DIAGNOSIS — Z12.31 VISIT FOR SCREENING MAMMOGRAM: ICD-10-CM

## 2022-02-04 PROCEDURE — 77067 SCR MAMMO BI INCL CAD: CPT

## 2022-02-10 DIAGNOSIS — Z11.59 ENCOUNTER FOR SCREENING FOR OTHER VIRAL DISEASES: Primary | ICD-10-CM

## 2022-03-04 ENCOUNTER — ANESTHESIA EVENT (OUTPATIENT)
Dept: GASTROENTEROLOGY | Facility: CLINIC | Age: 52
End: 2022-03-04
Payer: COMMERCIAL

## 2022-03-04 ENCOUNTER — LAB (OUTPATIENT)
Dept: FAMILY MEDICINE | Facility: CLINIC | Age: 52
End: 2022-03-04
Attending: SURGERY
Payer: COMMERCIAL

## 2022-03-04 DIAGNOSIS — Z11.59 ENCOUNTER FOR SCREENING FOR OTHER VIRAL DISEASES: ICD-10-CM

## 2022-03-04 LAB — SARS-COV-2 RNA RESP QL NAA+PROBE: NEGATIVE

## 2022-03-04 PROCEDURE — U0003 INFECTIOUS AGENT DETECTION BY NUCLEIC ACID (DNA OR RNA); SEVERE ACUTE RESPIRATORY SYNDROME CORONAVIRUS 2 (SARS-COV-2) (CORONAVIRUS DISEASE [COVID-19]), AMPLIFIED PROBE TECHNIQUE, MAKING USE OF HIGH THROUGHPUT TECHNOLOGIES AS DESCRIBED BY CMS-2020-01-R: HCPCS

## 2022-03-04 PROCEDURE — U0005 INFEC AGEN DETEC AMPLI PROBE: HCPCS

## 2022-03-04 PROCEDURE — 99207 PR NO CHARGE LOS: CPT

## 2022-03-04 ASSESSMENT — LIFESTYLE VARIABLES: TOBACCO_USE: 1

## 2022-03-04 NOTE — ANESTHESIA PREPROCEDURE EVALUATION
Anesthesia Pre-Procedure Evaluation    Patient: Deneen Hyde   MRN: [id number] : 1970        Procedure : Procedure(s):  COLONOSCOPY          No past medical history on file.   Past Surgical History:   Procedure Laterality Date     GYN SURGERY       HYSTERECTOMY       LAPAROSCOPIC HERNIORRHAPHY INGUINAL BILATERAL Bilateral 2018    Procedure: LAPAROSCOPIC HERNIORRHAPHY INGUINAL BILATERAL;  Laparoscopic Bilateral Inguinal Hernia Repair ;  Surgeon: Gadiel Roblero MD;  Location: [facility]      Allergies   Allergen Reactions     Orange Fruit [Citrus] Anaphylaxis     Tomatoes [Tomato] Anaphylaxis     Sulfa Drugs Swelling     PN: eyes swelled     Trimethoprim      PN: eyes swelling / itching      Social History     Tobacco Use     Smoking status: Former Smoker     Quit date: 1998     Years since quittin.3     Smokeless tobacco: Never Used   Substance Use Topics     Alcohol use: No      Wt Readings from Last 1 Encounters:   21 66.2 kg (146 lb)        Anesthesia Evaluation   Pt has had prior anesthetic.         ROS/MED HX  ENT/Pulmonary:     (+) tobacco use, Past use,     Neurologic:  - neg neurologic ROS     Cardiovascular:  - neg cardiovascular ROS     METS/Exercise Tolerance:     Hematologic:  - neg hematologic  ROS     Musculoskeletal:  - neg musculoskeletal ROS     GI/Hepatic:     (+) bowel prep,     Renal/Genitourinary:  - neg Renal ROS     Endo:  - neg endo ROS     Psychiatric/Substance Use:  - neg psychiatric ROS     Infectious Disease:  - neg infectious disease ROS     Malignancy:  - neg malignancy ROS     Other:  - neg other ROS          Physical Exam    Airway  airway exam normal      Mallampati: II   TM distance: > 3 FB   Neck ROM: full   Mouth opening: > 3 cm    Respiratory Devices and Support         Dental  no notable dental history         Cardiovascular   cardiovascular exam normal          Pulmonary   pulmonary exam normal                OUTSIDE LABS:  CBC:   Lab Results    Component Value Date    WBC 6.9 01/15/2020    HGB 12.9 01/15/2020    HCT 39.1 01/15/2020     01/15/2020     BMP:   Lab Results   Component Value Date     01/15/2020    POTASSIUM 3.8 01/15/2020    CHLORIDE 107 01/15/2020    CO2 25 01/15/2020    BUN 16 01/15/2020    CR 0.67 01/15/2020    GLC 79 11/11/2021    GLC 83 01/15/2020     COAGS: No results found for: PTT, INR, FIBR  POC: No results found for: BGM, HCG, HCGS  HEPATIC: No results found for: ALBUMIN, PROTTOTAL, ALT, AST, GGT, ALKPHOS, BILITOTAL, BILIDIRECT, SIVAKUMAR  OTHER:   Lab Results   Component Value Date    EVETTE 9.1 01/15/2020    TSH 3.57 01/15/2020       Anesthesia Plan    ASA Status:  2   NPO Status:  NPO Appropriate    Anesthesia Type: General.   Induction: Intravenous.   Maintenance: TIVA.        Consents    Anesthesia Plan(s) and associated risks, benefits, and realistic alternatives discussed. Questions answered and patient/representative(s) expressed understanding.    - Discussed:     - Discussed with:  Patient         Postoperative Care            Comments:                GIOVANI Roth CRNA

## 2022-03-07 ENCOUNTER — ANESTHESIA (OUTPATIENT)
Dept: GASTROENTEROLOGY | Facility: CLINIC | Age: 52
End: 2022-03-07
Payer: COMMERCIAL

## 2022-03-07 ENCOUNTER — HOSPITAL ENCOUNTER (OUTPATIENT)
Facility: CLINIC | Age: 52
Discharge: HOME | End: 2022-03-07
Attending: SURGERY | Admitting: SURGERY
Payer: COMMERCIAL

## 2022-03-07 VITALS
SYSTOLIC BLOOD PRESSURE: 104 MMHG | WEIGHT: 146 LBS | RESPIRATION RATE: 16 BRPM | OXYGEN SATURATION: 100 % | DIASTOLIC BLOOD PRESSURE: 72 MMHG | HEIGHT: 62 IN | HEART RATE: 71 BPM | TEMPERATURE: 48.9 F | BODY MASS INDEX: 26.87 KG/M2

## 2022-03-07 LAB — COLONOSCOPY: NORMAL

## 2022-03-07 PROCEDURE — G0121 COLON CA SCRN NOT HI RSK IND: HCPCS | Performed by: SURGERY

## 2022-03-07 PROCEDURE — 250N000009 HC RX 250: Performed by: NURSE ANESTHETIST, CERTIFIED REGISTERED

## 2022-03-07 PROCEDURE — 45378 DIAGNOSTIC COLONOSCOPY: CPT | Performed by: SURGERY

## 2022-03-07 PROCEDURE — 370N000017 HC ANESTHESIA TECHNICAL FEE, PER MIN: Performed by: SURGERY

## 2022-03-07 PROCEDURE — 250N000011 HC RX IP 250 OP 636: Performed by: NURSE ANESTHETIST, CERTIFIED REGISTERED

## 2022-03-07 PROCEDURE — 258N000003 HC RX IP 258 OP 636: Performed by: SURGERY

## 2022-03-07 PROCEDURE — 250N000009 HC RX 250: Performed by: SURGERY

## 2022-03-07 RX ORDER — LIDOCAINE HYDROCHLORIDE 10 MG/ML
INJECTION, SOLUTION EPIDURAL; INFILTRATION; INTRACAUDAL; PERINEURAL PRN
Status: DISCONTINUED | OUTPATIENT
Start: 2022-03-07 | End: 2022-03-07

## 2022-03-07 RX ORDER — PROPOFOL 10 MG/ML
INJECTION, EMULSION INTRAVENOUS PRN
Status: DISCONTINUED | OUTPATIENT
Start: 2022-03-07 | End: 2022-03-07

## 2022-03-07 RX ORDER — SODIUM CHLORIDE, SODIUM LACTATE, POTASSIUM CHLORIDE, CALCIUM CHLORIDE 600; 310; 30; 20 MG/100ML; MG/100ML; MG/100ML; MG/100ML
INJECTION, SOLUTION INTRAVENOUS CONTINUOUS
Status: DISCONTINUED | OUTPATIENT
Start: 2022-03-07 | End: 2022-03-07 | Stop reason: HOSPADM

## 2022-03-07 RX ORDER — LIDOCAINE 40 MG/G
CREAM TOPICAL
Status: DISCONTINUED | OUTPATIENT
Start: 2022-03-07 | End: 2022-03-07 | Stop reason: HOSPADM

## 2022-03-07 RX ORDER — PROPOFOL 10 MG/ML
INJECTION, EMULSION INTRAVENOUS CONTINUOUS PRN
Status: DISCONTINUED | OUTPATIENT
Start: 2022-03-07 | End: 2022-03-07

## 2022-03-07 RX ORDER — ONDANSETRON 2 MG/ML
4 INJECTION INTRAMUSCULAR; INTRAVENOUS
Status: DISCONTINUED | OUTPATIENT
Start: 2022-03-07 | End: 2022-03-07 | Stop reason: HOSPADM

## 2022-03-07 RX ORDER — GLYCOPYRROLATE 0.2 MG/ML
INJECTION, SOLUTION INTRAMUSCULAR; INTRAVENOUS PRN
Status: DISCONTINUED | OUTPATIENT
Start: 2022-03-07 | End: 2022-03-07

## 2022-03-07 RX ADMIN — GLYCOPYRROLATE 0.2 MG: 0.2 INJECTION, SOLUTION INTRAMUSCULAR; INTRAVENOUS at 07:55

## 2022-03-07 RX ADMIN — PROPOFOL 50 MG: 10 INJECTION, EMULSION INTRAVENOUS at 07:55

## 2022-03-07 RX ADMIN — LIDOCAINE HYDROCHLORIDE 0.1 ML: 10 INJECTION, SOLUTION EPIDURAL; INFILTRATION; INTRACAUDAL; PERINEURAL at 07:31

## 2022-03-07 RX ADMIN — PROPOFOL 200 MCG/KG/MIN: 10 INJECTION, EMULSION INTRAVENOUS at 07:55

## 2022-03-07 RX ADMIN — SODIUM CHLORIDE, POTASSIUM CHLORIDE, SODIUM LACTATE AND CALCIUM CHLORIDE: 600; 310; 30; 20 INJECTION, SOLUTION INTRAVENOUS at 07:31

## 2022-03-07 RX ADMIN — LIDOCAINE HYDROCHLORIDE 50 MG: 10 INJECTION, SOLUTION EPIDURAL; INFILTRATION; INTRACAUDAL; PERINEURAL at 07:55

## 2022-03-07 NOTE — H&P
"51 year old year old female here for colonoscopy for screening.    There is no problem list on file for this patient.      No past medical history on file.    Past Surgical History:   Procedure Laterality Date     GYN SURGERY       HYSTERECTOMY       LAPAROSCOPIC HERNIORRHAPHY INGUINAL BILATERAL Bilateral 6/12/2018    Procedure: LAPAROSCOPIC HERNIORRHAPHY INGUINAL BILATERAL;  Laparoscopic Bilateral Inguinal Hernia Repair ;  Surgeon: Gadiel Roblero MD;  Location: [facility]       @[facility]@    No current outpatient medications on file.       Allergies   Allergen Reactions     Orange Fruit [Citrus] Anaphylaxis     Tomatoes [Tomato] Anaphylaxis     Sulfa Drugs Swelling     PN: eyes swelled     Trimethoprim      PN: eyes swelling / itching       Pt reports that she quit smoking about 23 years ago. She has never used smokeless tobacco. She reports that she does not drink alcohol and does not use drugs.    Exam:  /75 (BP Location: Left arm)   Pulse 78   Temp (!) 48.9  F (9.4  C) (Oral)   Resp 16   Ht 1.581 m (5' 2.25\")   Wt 66.2 kg (146 lb)   SpO2 98%   BMI 26.49 kg/m      Awake, Alert OX3  Lungs - CTA bilaterally  CV - RRR, no murmurs, distal pulses intact  Abd - soft, non-distended, non-tender, +BS  Extr - No cyanosis or edema    A/P 51 year old year old female in need of colonoscopy for screening. Risks, benefits, alternatives, and complications were discussed including the possibility of perforation and the patient agreed to proceed    Gadiel Roblero MD     "

## 2022-03-07 NOTE — ANESTHESIA CARE TRANSFER NOTE
Patient: Deneen Hyde    Procedure: Procedure(s):  COLONOSCOPY       Diagnosis: Special screening for malignant neoplasm of colon [Z12.11]  Diagnosis Additional Information: No value filed.    Anesthesia Type:   General     Note:    Oropharynx: spontaneously breathing  Level of Consciousness: drowsy  Oxygen Supplementation: room air    Independent Airway: airway patency satisfactory and stable  Dentition: dentition unchanged  Vital Signs Stable: post-procedure vital signs reviewed and stable  Report to RN Given: handoff report given  Patient transferred to: Phase II    Handoff Report: Identifed the Patient, Identified the Reponsible Provider, Reviewed the pertinent medical history, Discussed the surgical course, Reviewed Intra-OP anesthesia mangement and issues during anesthesia, Set expectations for post-procedure period and Allowed opportunity for questions and acknowledgement of understanding      Vitals:  Vitals Value Taken Time   BP 95/61 03/07/22 0814   Temp     Pulse 71 03/07/22 0812   Resp 16 03/07/22 0814   SpO2 95 % 03/07/22 0815   Vitals shown include unvalidated device data.    Electronically Signed By: GIOVANI Oro CRNA  March 7, 2022  8:15 AM

## 2022-08-11 ENCOUNTER — OFFICE VISIT (OUTPATIENT)
Dept: ALLERGY | Facility: CLINIC | Age: 52
End: 2022-08-11
Attending: NURSE PRACTITIONER
Payer: COMMERCIAL

## 2022-08-11 VITALS
DIASTOLIC BLOOD PRESSURE: 64 MMHG | OXYGEN SATURATION: 96 % | TEMPERATURE: 97.8 F | HEART RATE: 69 BPM | WEIGHT: 149.03 LBS | BODY MASS INDEX: 27.04 KG/M2 | SYSTOLIC BLOOD PRESSURE: 95 MMHG

## 2022-08-11 DIAGNOSIS — J30.1 SEASONAL ALLERGIC RHINITIS DUE TO POLLEN: ICD-10-CM

## 2022-08-11 DIAGNOSIS — Z91.018 FOOD ALLERGY: ICD-10-CM

## 2022-08-11 DIAGNOSIS — T78.49XA OTHER ALLERGY, INITIAL ENCOUNTER: Primary | ICD-10-CM

## 2022-08-11 PROCEDURE — 86003 ALLG SPEC IGE CRUDE XTRC EA: CPT | Performed by: ALLERGY & IMMUNOLOGY

## 2022-08-11 PROCEDURE — 82785 ASSAY OF IGE: CPT | Performed by: ALLERGY & IMMUNOLOGY

## 2022-08-11 PROCEDURE — 36415 COLL VENOUS BLD VENIPUNCTURE: CPT | Performed by: ALLERGY & IMMUNOLOGY

## 2022-08-11 PROCEDURE — 99244 OFF/OP CNSLTJ NEW/EST MOD 40: CPT | Performed by: ALLERGY & IMMUNOLOGY

## 2022-08-11 RX ORDER — EPINEPHRINE 0.3 MG/.3ML
INJECTION SUBCUTANEOUS
COMMUNITY
Start: 2021-11-11 | End: 2022-08-11

## 2022-08-11 RX ORDER — EPINEPHRINE 0.3 MG/.3ML
0.3 INJECTION SUBCUTANEOUS PRN
Qty: 2 EACH | Refills: 3 | Status: SHIPPED | OUTPATIENT
Start: 2022-08-11 | End: 2023-08-18

## 2022-08-11 RX ORDER — ACETAMINOPHEN 160 MG
TABLET,DISINTEGRATING ORAL DAILY
COMMUNITY

## 2022-08-11 ASSESSMENT — ENCOUNTER SYMPTOMS
DIARRHEA: 0
NAUSEA: 0
CHEST TIGHTNESS: 0
VOMITING: 0
FACIAL SWELLING: 0
ARTHRALGIAS: 0
ACTIVITY CHANGE: 0
CHILLS: 0
EYE ITCHING: 0
WHEEZING: 0
EYE DISCHARGE: 0
RHINORRHEA: 0
SINUS PRESSURE: 0
COUGH: 0
HEADACHES: 0
EYE REDNESS: 0
SHORTNESS OF BREATH: 0
MYALGIAS: 0
JOINT SWELLING: 0
ADENOPATHY: 0
FEVER: 0

## 2022-08-11 NOTE — PATIENT INSTRUCTIONS
Allergy Staff Appt Hours Shot Hours Locations    Physician     Bret Phan MD       Support Staff     Wendy RN     Melissa RN     Tulio LPN     K[place] CMA    Tuesday:   [place] [facility]  [place] :         [facility] :  [place] 7-3  Cost        Thursday: :        Friday: 7-12:20     [place]        Tuesday: : : :: :: :    [place]       Tues & Wed: :       Mon & Thurs: :       Fri: :           [facility]  [address] [place] [address]  Appt Line: [phone]      [facility]  [address] [place] [address]  Appt Line: [phone]    Pulmonary Function Scheduling:  [place]: [phone]  [place]: [phone]  [phone]     Important Scheduling Information (if recommended by provider):  Aspirin Desensitization: Appt will last 2 clinic days. Please call the Allergy RN line for your clinic to schedule. Discontinue antihistamines 7 days prior to the appointment.     Food Challenges: Appt will last 3-4 hours. Please call the Allergy RN line for your clinic to schedule. Discontinue antihistamines 7 days prior to the appointment.     Penicillin Testing: Appt will last 2-3 hours. Please call the Allergy RN line for your clinic to schedule. Discontinue antihistamines 7 days prior to the appointment.     Skin Testing: Appt will about 40 minutes. Call the appointment line for your clinic to schedule. Discontinue antihistamines 7 days prior to the appointment.     Thank you for trusting us with your Allergy, Asthma, and Immunology care. Please feel free to contact us with any questions or concerns you may have.      [facility] Prescription Assistance Program [phone]

## 2022-08-11 NOTE — LETTER
"    8/11/2022         RE: Deneen Hyde  [address] [place] [id number]        Dear Colleague,    Thank you for referring your patient, Deneen Hyde, to the [facility]. Please see a copy of my visit note below.    SUBJECTIVE:                                                                   Deneen Hyde is a 51-year-old female who presents today to our Allergy Clinic at [facility]; She is being seen in consultation at the request of Leann Cisneros CNP, for food allergy evaluation.  Multiple years ago, she had an episode of eating tomatoes. Soon after, she developed difficulty breathing, shortness of breath, sweating, and generalized itchiness. She also felt that she had \"thickened\" skin on her cheeks. [facility] with epinephrine. Feels that she had some milder episodes prior. She has been avoiding tomatoes since then.  She had similar episodes after eating oranges. Those episodes were treated with oral diphenhydramine and oral steroids.  She has been avoiding both oranges and tomatoes. She has no issues eating other foods. In the past, whether she was reactive to bowel pepper or lemon was a question. She has no problems eating bell pepper, lime, or lemon.  Has a history of allergic rhinitis. as previously evaluated at [facility], Allergy. According to their notes, allergy testing in 2008 was positive for dust mites, tree pollen, grass pollen, and ragweed.  The patient states that with cetirizine 10 mg by mouth once daily her symptoms are well controlled.  Apparently, she also had some unexplained episodes of pruritus of the skin, and possible urticaria with angioedema.  For that reason, she has been taking cetirizine once daily for years.  Has not had any episodes recently. Except, for the past 25 years, she feels like her hands get slightly itchy when she goes into [employer].    Had serum tryptase " drawn in the past. I am reviewing the note from 2012 from Dr. Darlyn Erwin. The serum tryptase level about 5 hours after the symptom onset was 3.4  I reviewed serum IgE from .  Negative for mushroom, egg, tomato, and onion.  She states that she has no issues eating mushrooms, eggs, or onions      There is no problem list on file for this patient.      History reviewed. No pertinent past medical history.   Problem (# of Occurrences) Relation (Name,Age of Onset)    Asthma (1) Mother    Depression (1) Daughter    Suicide (2) Father, Sister    Thyroid Disease (2) Mother, Sister        Past Surgical History:   Procedure Laterality Date     COLONOSCOPY N/A 3/7/2022    Procedure: COLONOSCOPY;  Surgeon: Gadiel Roblero MD;  Location: [facility]     GYN SURGERY       HYSTERECTOMY       LAPAROSCOPIC HERNIORRHAPHY INGUINAL BILATERAL Bilateral 2018    Procedure: LAPAROSCOPIC HERNIORRHAPHY INGUINAL BILATERAL;  Laparoscopic Bilateral Inguinal Hernia Repair ;  Surgeon: Gadiel Roblero MD;  Location: [place] OR     Social History     Socioeconomic History     Marital status:      Spouse name: None     Number of children: None     Years of education: None     Highest education level: None   Occupational History     Employer: [id number]     Comment: CURRENTLY ON LEAVE     Occupation:      Comment: WORKS [employer] EMPLOYED   Tobacco Use     Smoking status: Former Smoker     Packs/day: 0.50     Quit date: 1998     Years since quittin.7     Smokeless tobacco: Never Used   Vaping Use     Vaping Use: Never used   Substance and Sexual Activity     Alcohol use: No     Drug use: No     Sexual activity: Yes     Birth control/protection: Female Surgical     Comment: hysterectomy    Other Topics Concern     Parent/sibling w/ CABG, MI or angioplasty before 65F 55M? No   Social History Narrative    2022    ENVIRONMENTAL HISTORY: [place] The home is heated with a forced air. They do have central air conditioning. The patient's bedroom is furnished with carpeting in bedroom, fabric window coverings, and animals sleep in bedroom.  Pets inside the house include 2 dog(s) and 1 Getco. There is history of mice infestation. There is/are 0 smokers in the house.  The house does not have a basement, only a crawl space.            Review of Systems   Constitutional: Negative for activity change, chills and fever.   HENT: Negative for congestion, dental problem, ear pain, facial swelling, nosebleeds, postnasal drip, rhinorrhea, sinus pressure and sneezing.    Eyes: Negative for discharge, redness and itching.   Respiratory: Negative for cough, chest tightness, shortness of breath and wheezing.    Cardiovascular: Negative for chest pain.   Gastrointestinal: Negative for diarrhea, nausea and vomiting.   Musculoskeletal: Negative for arthralgias, joint swelling and myalgias.   Skin: Negative for rash.   Allergic/Immunologic: Positive for food allergies.   Neurological: Negative for headaches.   Hematological: Negative for adenopathy.   Psychiatric/Behavioral: Negative for behavioral problems and self-injury.           Current Outpatient Medications:      B Complex-C (VITAMIN B COMPLEX W/VITAMIN C) TABS tablet, Take 1 tablet by mouth daily, Disp: , Rfl:      CALCIUM PO, 1 tablet daily when she remembers , unknown dose, Disp: , Rfl:      cetirizine (ZYRTEC) 10 MG tablet, Take 10 mg by mouth daily, Disp: , Rfl:      Cholecalciferol (VITAMIN D3) 50 MCG (2000 UT) CAPS, Take by mouth daily, Disp: , Rfl:      EPINEPHrine (ANY BX GENERIC EQUIV) 0.3 MG/0.3ML injection 2-pack, Inject 0.3 mLs (0.3 mg) into the muscle as needed for anaphylaxis, Disp: 2 each, Rfl: 3     melatonin 1 MG CAPS, Take 7.5 mg by mouth, Disp: , Rfl:      Multiple Vitamins-Minerals (MULTIVITAMIN PO), , Disp: , Rfl:      Omega-3 Fish Oil 500 MG capsule, Take 1 capsule (500 mg) by mouth  daily, Disp: , Rfl:      Probiotic Product (PRO-BIOTIC BLEND PO), , Disp: , Rfl:      acetaminophen (TYLENOL) 500 MG tablet, Take 1,000 mg by mouth every 6 hours as needed for mild pain, Disp: , Rfl:      ibuprofen (ADVIL/MOTRIN) 200 MG tablet, Take 600 mg by mouth every 6 hours as needed for mild pain, Disp: , Rfl:   Immunization History   Administered Date(s) Administered     COVID-19,PF,Pfizer (12+ Yrs) 08/12/2021, 09/02/2021     TD (ADULT, 7+) 05/07/1999     Td (Adult), Adsorbed 06/14/2011     Tdap (Adacel,Boostrix) 11/11/2021     Allergies   Allergen Reactions     Orange Fruit [Citrus] Anaphylaxis     Tomatoes [Tomato] Anaphylaxis     Sulfa Drugs Swelling     PN: eyes swelled     Trimethoprim      PN: eyes swelling / itching     OBJECTIVE:                                                                 BP 95/64 (BP Location: Left arm, Patient Position: Sitting, Cuff Size: Adult Regular)   Pulse 69   Temp 97.8  F (36.6  C) (Tympanic)   Wt 67.6 kg (149 lb 0.5 oz)   SpO2 96%   BMI 27.04 kg/m          Physical Exam  Vitals and nursing note reviewed.   Constitutional:       General: She is not in acute distress.     Appearance: She is not ill-appearing, toxic-appearing or diaphoretic.   HENT:      Head: Normocephalic and atraumatic.      Right Ear: Tympanic membrane, ear canal and external ear normal.      Left Ear: Tympanic membrane, ear canal and external ear normal.      Nose: Septal deviation (Small-medium spur to the right) present. No mucosal edema, congestion or rhinorrhea.      Right Turbinates: Not enlarged.      Left Turbinates: Not enlarged.      Mouth/Throat:      Lips: Pink.      Mouth: Mucous membranes are moist.      Pharynx: Oropharynx is clear. No pharyngeal swelling, oropharyngeal exudate, posterior oropharyngeal erythema or uvula swelling.   Eyes:      General:         Right eye: No discharge.         Left eye: No discharge.      Conjunctiva/sclera: Conjunctivae normal.   Cardiovascular:       Rate and Rhythm: Normal rate and regular rhythm.      Heart sounds: Normal heart sounds. No murmur heard.  Pulmonary:      Effort: Pulmonary effort is normal. No respiratory distress.      Breath sounds: Normal breath sounds and air entry. No stridor, decreased air movement or transmitted upper airway sounds. No decreased breath sounds, wheezing, rhonchi or rales.   Musculoskeletal:      Cervical back: Normal range of motion.   Neurological:      Mental Status: She is alert and oriented to person, place, and time.   Psychiatric:         Mood and Affect: Mood normal.         Behavior: Behavior normal.           ASSESSMENT/PLAN:    Other allergy, initial encounter  Food allergy    Oral allergy syndrome?  Idiopathic urticaria and angioedema at that time with tomato and orange not a true culprit?  - I will order serum IgE for tomato and orange.  - Depending on the results, anticipate SPT with fresh orange and fresh tomato.  - Meanwhile, continue strict avoidance.  - Epinephrine autoinjector was refilled.  Anaphylaxis action plan was reviewed and provided.      - Allergen tomato IgE  - Allergen orange IgE  - IgE  - EPINEPHrine (ANY BX GENERIC EQUIV) 0.3 MG/0.3ML injection 2-pack  Dispense: 2 each; Refill: 3    Seasonal allergic rhinitis due to pollen    History of allergic rhinitis in the past.  Currently asymptomatic with cetirizine 10 mg by mouth once daily.  - I ordered serum IgE for regional aeroallergen panel to have more updated information.  - Meanwhile, continue with cetirizine 10 mg by mouth once daily.    - Allergen cat epithellium IgE  - Allergen dog epithelium IgE  - Allergen Tapan grass IgE  - Allergen orchard grass IgE  - Allergen orion IgE  - Allergen D farinae IgE  - Allergen D pteronyssinus IgE  - Allergen alternaria alternata IgE  - Allergen aspergillus fumigatus IgE  - Allergen cladosporium herbarum IgE  - Allergen Epicoccum purpurascens IgE  - Allergen penicillium notatum IgE  - Allergen felisa white  IgE  - Allergen Cedar IgE  - Allergen cottonwood IgE  - Allergen elm IgE  - Allergen maple box elder IgE  - Allergen oak white IgE  - Allergen Red Buda IgE  - Allergen silver  birch IgE  - Allergen Tree White Buda IgE  - Allergen [place] Tree  - Allergen white pine IgE  - Allergen English plantain IgE  - Allergen giant ragweed IgE  - Allergen lamb's quarter IgE  - Allergen Mugwort IgE  - Allergen ragweed short IgE  - Allergen Sagebrush Wormwood IgE  - Allergen Sheep Sorrel IgE  - Allergen thistle Russian IgE  - Allergen Weed Nettle IgE  - Allergen, Kochia/Firebush  - IgE       Return in about 6 months (around 2/11/2023), or if symptoms worsen or fail to improve.    Thank you for allowing us to participate in the care of this patient. Please feel free to contact us if there are any questions or concerns about the patient.    Disclaimer: This note consists of symbols derived from keyboarding, dictation and/or voice recognition software. As a result, there may be errors in the script that have gone undetected. Please consider this when interpreting information found in this chart.    Bret Phan MD, FAAAAI, FACAAI  Allergy, Asthma and Immunology     [facility]         Again, thank you for allowing me to participate in the care of your patient.        Sincerely,        Bret Phan MD

## 2022-08-11 NOTE — LETTER
ANAPHYLAXIS ALLERGY PLAN    Name: Deneen Hyde      :  1970    Allergy to:  Orange, tomato    Weight: 149 lbs .5 oz           Asthma:  No      Do not depend on antihistamines or inhalers (bronchodilators) to treat a severe reaction; USE EPINEPHRINE      MEDICATIONS/DOSES  Epinephrine:  EpiPen/Adrenaclick/Auvi-Q  Epinephrine dose:  0.3 mg IM  Antihistamine:  Zyrtec (Cetirizine)  Antihistamine dose:  20 mg   Other (e.g., inhaler-bronchodilator if wheezing):  none       ANAPHYLAXIS ALLERGY PLAN (Page 2)  Patient:  Deneen Hyde  :  1970         Electronically signed on 2022 by:  Bret Phan MD  Parent/Guardian Authorization Signature:  ___________________________ Date:    FORM PROVIDED COURTESY OF FOOD ALLERGY RESEARCH & EDUCATION (FARE) ([email or web address]) 2017

## 2022-08-11 NOTE — PROGRESS NOTES
"SUBJECTIVE:                                                                   Deneen Hyde is a 51-year-old female who presents today to our Allergy Clinic at [facility]; She is being seen in consultation at the request of Leann Cisneros CNP, for food allergy evaluation.  Multiple years ago, she had an episode of eating tomatoes. Soon after, she developed difficulty breathing, shortness of breath, sweating, and generalized itchiness. She also felt that she had \"thickened\" skin on her cheeks. [facility]. Feels that she had some milder episodes prior. She has been avoiding tomatoes since then.  She had similar episodes after eating oranges. Those episodes were treated with oral diphenhydramine and oral steroids.  She has been avoiding both oranges and tomatoes. She has no issues eating other foods. In the past, whether she was reactive to bowel pepper or lemon was a question. She has no problems eating bell pepper, lime, or lemon.  Has a history of allergic rhinitis. as previously evaluated at [facility], Allergy. According to their notes, allergy testing in 2008 was positive for dust mites, tree pollen, grass pollen, and ragweed.  The patient states that with cetirizine 10 mg by mouth once daily her symptoms are well controlled.  Apparently, she also had some unexplained episodes of pruritus of the skin, and possible urticaria with angioedema.  For that reason, she has been taking cetirizine once daily for years.  Has not had any episodes recently. Except, for the past 25 years, she feels like her hands get slightly itchy when she goes into [employer].    Had serum tryptase drawn in the past. I am reviewing the note from December 2012 from Dr. Darlyn Erwin. The serum tryptase level about 5 hours after the symptom onset was 3.4  I reviewed serum IgE from 2009.  Negative for mushroom, egg, tomato, and onion.  She states " that she has no issues eating mushrooms, eggs, or onions      There is no problem list on file for this patient.      History reviewed. No pertinent past medical history.   Problem (# of Occurrences) Relation (Name,Age of Onset)    Asthma (1) Mother    Depression (1) Daughter    Suicide (2) Father, Sister    Thyroid Disease (2) Mother, Sister        Past Surgical History:   Procedure Laterality Date     COLONOSCOPY N/A 3/7/2022    Procedure: COLONOSCOPY;  Surgeon: Gadiel Roblero MD;  Location: [facility]     GYN SURGERY       HYSTERECTOMY       LAPAROSCOPIC HERNIORRHAPHY INGUINAL BILATERAL Bilateral 2018    Procedure: LAPAROSCOPIC HERNIORRHAPHY INGUINAL BILATERAL;  Laparoscopic Bilateral Inguinal Hernia Repair ;  Surgeon: Gadiel Roblero MD;  Location: [facility] OR     Social History     Socioeconomic History     Marital status:      Spouse name: None     Number of children: None     Years of education: None     Highest education level: None   Occupational History     Employer: [id number]     Comment: CURRENTLY ON LEAVE     Occupation:      Comment: WORKS [employer] EMPLOYED   Tobacco Use     Smoking status: Former Smoker     Packs/day: 0.50     Quit date: 1998     Years since quittin.7     Smokeless tobacco: Never Used   Vaping Use     Vaping Use: Never used   Substance and Sexual Activity     Alcohol use: No     Drug use: No     Sexual activity: Yes     Birth control/protection: Female Surgical     Comment: hysterectomy    Other Topics Concern     Parent/sibling w/ CABG, MI or angioplasty before 65F 55M? No   Social History Narrative    2022    ENVIRONMENTAL HISTORY: [place] home is heated with a forced air. They do have central air conditioning. The patient's bedroom is furnished with carpeting in bedroom, fabric window coverings, and animals sleep in bedroom.  Pets inside the house include 2 dog(s)  and 1 [employer]. There is history of mice infestation. There is/are 0 smokers in the house.  The house does not have a basement, only a crawl space.            Review of Systems   Constitutional: Negative for activity change, chills and fever.   HENT: Negative for congestion, dental problem, ear pain, facial swelling, nosebleeds, postnasal drip, rhinorrhea, sinus pressure and sneezing.    Eyes: Negative for discharge, redness and itching.   Respiratory: Negative for cough, chest tightness, shortness of breath and wheezing.    Cardiovascular: Negative for chest pain.   Gastrointestinal: Negative for diarrhea, nausea and vomiting.   Musculoskeletal: Negative for arthralgias, joint swelling and myalgias.   Skin: Negative for rash.   Allergic/Immunologic: Positive for food allergies.   Neurological: Negative for headaches.   Hematological: Negative for adenopathy.   Psychiatric/Behavioral: Negative for behavioral problems and self-injury.           Current Outpatient Medications:      B Complex-C (VITAMIN B COMPLEX W/VITAMIN C) TABS tablet, Take 1 tablet by mouth daily, Disp: , Rfl:      CALCIUM PO, 1 tablet daily when she remembers , unknown dose, Disp: , Rfl:      cetirizine (ZYRTEC) 10 MG tablet, Take 10 mg by mouth daily, Disp: , Rfl:      Cholecalciferol (VITAMIN D3) 50 MCG (2000 UT) CAPS, Take by mouth daily, Disp: , Rfl:      EPINEPHrine (ANY BX GENERIC EQUIV) 0.3 MG/0.3ML injection 2-pack, Inject 0.3 mLs (0.3 mg) into the muscle as needed for anaphylaxis, Disp: 2 each, Rfl: 3     melatonin 1 MG CAPS, Take 7.5 mg by mouth, Disp: , Rfl:      Multiple Vitamins-Minerals (MULTIVITAMIN PO), , Disp: , Rfl:      Omega-3 Fish Oil 500 MG capsule, Take 1 capsule (500 mg) by mouth daily, Disp: , Rfl:      Probiotic Product (PRO-BIOTIC BLEND PO), , Disp: , Rfl:      acetaminophen (TYLENOL) 500 MG tablet, Take 1,000 mg by mouth every 6 hours as needed for mild pain, Disp: , Rfl:      ibuprofen (ADVIL/MOTRIN) 200 MG tablet, Take  600 mg by mouth every 6 hours as needed for mild pain, Disp: , Rfl:   Immunization History   Administered Date(s) Administered     COVID-19,PF,Pfizer (12+ Yrs) 08/12/2021, 09/02/2021     TD (ADULT, 7+) 05/07/1999     Td (Adult), Adsorbed 06/14/2011     Tdap (Adacel,Boostrix) 11/11/2021     Allergies   Allergen Reactions     Orange Fruit [Citrus] Anaphylaxis     Tomatoes [Tomato] Anaphylaxis     Sulfa Drugs Swelling     PN: eyes swelled     Trimethoprim      PN: eyes swelling / itching     OBJECTIVE:                                                                 BP 95/64 (BP Location: Left arm, Patient Position: Sitting, Cuff Size: Adult Regular)   Pulse 69   Temp 97.8  F (36.6  C) (Tympanic)   Wt 67.6 kg (149 lb 0.5 oz)   SpO2 96%   BMI 27.04 kg/m          Physical Exam  Vitals and nursing note reviewed.   Constitutional:       General: She is not in acute distress.     Appearance: She is not ill-appearing, toxic-appearing or diaphoretic.   HENT:      Head: Normocephalic and atraumatic.      Right Ear: Tympanic membrane, ear canal and external ear normal.      Left Ear: Tympanic membrane, ear canal and external ear normal.      Nose: Septal deviation (Small-medium spur to the right) present. No mucosal edema, congestion or rhinorrhea.      Right Turbinates: Not enlarged.      Left Turbinates: Not enlarged.      Mouth/Throat:      Lips: Pink.      Mouth: Mucous membranes are moist.      Pharynx: Oropharynx is clear. No pharyngeal swelling, oropharyngeal exudate, posterior oropharyngeal erythema or uvula swelling.   Eyes:      General:         Right eye: No discharge.         Left eye: No discharge.      Conjunctiva/sclera: Conjunctivae normal.   Cardiovascular:      Rate and Rhythm: Normal rate and regular rhythm.      Heart sounds: Normal heart sounds. No murmur heard.  Pulmonary:      Effort: Pulmonary effort is normal. No respiratory distress.      Breath sounds: Normal breath sounds and air entry. No  stridor, decreased air movement or transmitted upper airway sounds. No decreased breath sounds, wheezing, rhonchi or rales.   Musculoskeletal:      Cervical back: Normal range of motion.   Neurological:      Mental Status: She is alert and oriented to person, place, and time.   Psychiatric:         Mood and Affect: Mood normal.         Behavior: Behavior normal.           ASSESSMENT/PLAN:    Other allergy, initial encounter  Food allergy    Oral allergy syndrome?  Idiopathic urticaria and angioedema at that time with tomato and orange not a true culprit?  - I will order serum IgE for tomato and orange.  - Depending on the results, anticipate SPT with fresh orange and fresh tomato.  - Meanwhile, continue strict avoidance.  - Epinephrine autoinjector was refilled.  Anaphylaxis action plan was reviewed and provided.      - Allergen tomato IgE  - Allergen orange IgE  - IgE  - EPINEPHrine (ANY BX GENERIC EQUIV) 0.3 MG/0.3ML injection 2-pack  Dispense: 2 each; Refill: 3    Seasonal allergic rhinitis due to pollen    History of allergic rhinitis in the past.  Currently asymptomatic with cetirizine 10 mg by mouth once daily.  - I ordered serum IgE for regional aeroallergen panel to have more updated information.  - Meanwhile, continue with cetirizine 10 mg by mouth once daily.    - Allergen cat epithellium IgE  - Allergen dog epithelium IgE  - Allergen Tapan grass IgE  - Allergen orchard grass IgE  - Allergen orion IgE  - Allergen D farinae IgE  - Allergen D pteronyssinus IgE  - Allergen alternaria alternata IgE  - Allergen aspergillus fumigatus IgE  - Allergen cladosporium herbarum IgE  - Allergen Epicoccum purpurascens IgE  - Allergen penicillium notatum IgE  - Allergen felisa white IgE  - Allergen Cedar IgE  - Allergen cottonwood IgE  - Allergen elm IgE  - Allergen maple box elder IgE  - Allergen oak white IgE  - Allergen Red [place] IgE  - Allergen silver  birch IgE  - Allergen Tree White [place] IgE  - Allergen  [place] Tree  - Allergen white pine IgE  - Allergen English plantain IgE  - Allergen giant ragweed IgE  - Allergen lamb's quarter IgE  - Allergen Mugwort IgE  - Allergen ragweed short IgE  - Allergen Sagebrush Wormwood IgE  - Allergen Sheep Sorrel IgE  - Allergen thistle Russian IgE  - Allergen Weed Nettle IgE  - Allergen, Kochia/Firebush  - IgE       Return in about 6 months (around 2/11/2023), or if symptoms worsen or fail to improve.    Thank you for allowing us to participate in the care of this patient. Please feel free to contact us if there are any questions or concerns about the patient.    Disclaimer: This note consists of symbols derived from keyboarding, dictation and/or voice recognition software. As a result, there may be errors in the script that have gone undetected. Please consider this when interpreting information found in this chart.    Bret Phan MD, FAAAAI, FACAAI  Allergy, Asthma and Immunology     [facility]      present

## 2022-08-11 NOTE — NURSING NOTE
RN reviewed Anaphylaxis Action Plan with patient. Educated on the symptoms and treatment of anaphylaxis. Went through the different ways that a reaction can present, and the body systems that it can affect. Patient verbalized understanding.     Write demonstrated epi pen technique.  Informed that she must pull blue end off of pen before use.  Hold firmly in one hand and press down into the thigh. The injection should go in the middle, outer thigh and hold for 10 seconds to ensure the delivery of all medication.  Informed that the needle can go through clothing but that any seams should be avoided.  Instructed to keep both pens together in case a second dose is needed.  Instructed that if epi is used, he should still go to the ED.  Instructed to keep epi-pen out of extreme temperatures.  Check expiration date.  Do not use  Epi.  She verbalized understanding.    Writer demonstrated how to use the AdrenClick epinephrine auto-injector.  Patient was instructed to remove auto-injector from casing.  Patient instructed to form a fist around the auto-injector, remove caps labeled 1 and then 2 (never placing finger/thumb over ), then firmly push red tip against outer thigh, holding approximately 10 seconds.  Patient advised that once used, needle WILL be exposed.  Patient is to properly dispose of needle in sharps container and not regular trash can.  Patient advised to call 911 or go to emergency department after epi-pen use for further monitoring.         Wendy OSEI RN  Specialty/Allergy Clinics

## 2022-08-12 LAB
A ALTERNATA IGE QN: <0.1 KU(A)/L
A FUMIGATUS IGE QN: <0.1 KU(A)/L
C HERBARUM IGE QN: <0.1 KU(A)/L
CALIF WALNUT POLN IGE QN: <0.1 KU(A)/L
CAT DANDER IGG QN: <0.1 KU(A)/L
CEDAR IGE QN: <0.1 KU(A)/L
COTTONWOOD IGE QN: 0.16 KU(A)/L
D FARINAE IGE QN: 0.13 KU(A)/L
EAST WHITE PINE IGE QN: <0.1 KU(A)/L
IGE SERPL-ACNC: 33 KU/L (ref 0–114)
NETTLE IGE QN: <0.1 KU(A)/L
RED MULBERRY IGE QN: <0.1 KU(A)/L
SALTWORT IGE QN: <0.1 KU(A)/L
SHEEP SORREL IGE QN: <0.1 KU(A)/L
SILVER BIRCH IGE QN: <0.1 KU(A)/L
TIMOTHY IGE QN: <0.1 KU(A)/L
TOMATO IGE QN: <0.1 KU(A)/L
WHITE ASH IGE QN: <0.1 KU(A)/L
WHITE MULBERRY IGE QN: <0.1 KU(A)/L
WORMWOOD IGE QN: <0.1 KU(A)/L

## 2022-08-15 LAB
COCKSFOOT IGE QN: <0.1 KU(A)/L
COMMON RAGWEED IGE QN: <0.1 KU(A)/L
D PTERONYSS IGE QN: 0.33 KU(A)/L
DOG DANDER+EPITH IGE QN: <0.1 KU(A)/L
E PURPURASCENS IGE QN: <0.1 KU(A)/L
ENGL PLANTAIN IGE QN: <0.1 KU(A)/L
FIREBUSH IGE QN: <0.1 KU(A)/L
GIANT RAGWEED IGE QN: <0.1 KU(A)/L
GOOSEFOOT IGE QN: <0.1 KU(A)/L
JOHNSON GRASS IGE QN: <0.1 KU(A)/L
MAPLE IGE QN: 8.25 KU(A)/L
MUGWORT IGE QN: <0.1 KU(A)/L
ORANGE IGE QN: 0.16 KU(A)/L
P NOTATUM IGE QN: <0.1 KU(A)/L
WHITE ELM IGE QN: <0.1 KU(A)/L
WHITE OAK IGE QN: <0.1 KU(A)/L

## 2022-08-16 NOTE — RESULT ENCOUNTER NOTE
AppsFunder message sent:     Total serum IgE is within normal limits.  Sensitivity to maple tree pollen noted.  Also mild sensitivity to dust mites and pollen of [place].  -Recommend avoidance measures.  - No changes to the treatment plan.  Continue with cetirizine 10 mg by mouth once daily as needed.  Negative serum IgE for tomato.  Slight sensitivity to orange.  - Recommend skin test with fresh tomato and fresh orange.  Depending on the results, consider oral food challenge test in the office settings.

## 2022-09-17 ENCOUNTER — HEALTH MAINTENANCE LETTER (OUTPATIENT)
Age: 52
End: 2022-09-17

## 2023-01-23 ENCOUNTER — HEALTH MAINTENANCE LETTER (OUTPATIENT)
Age: 53
End: 2023-01-23

## 2023-05-06 ENCOUNTER — HEALTH MAINTENANCE LETTER (OUTPATIENT)
Age: 53
End: 2023-05-06

## 2023-08-15 ENCOUNTER — OFFICE VISIT (OUTPATIENT)
Dept: FAMILY MEDICINE | Facility: CLINIC | Age: 53
End: 2023-08-15
Payer: COMMERCIAL

## 2023-08-15 VITALS
SYSTOLIC BLOOD PRESSURE: 128 MMHG | HEIGHT: 62 IN | RESPIRATION RATE: 16 BRPM | HEART RATE: 73 BPM | WEIGHT: 143.4 LBS | OXYGEN SATURATION: 98 % | BODY MASS INDEX: 26.39 KG/M2 | TEMPERATURE: 97.7 F | DIASTOLIC BLOOD PRESSURE: 68 MMHG

## 2023-08-15 DIAGNOSIS — Z12.31 VISIT FOR SCREENING MAMMOGRAM: ICD-10-CM

## 2023-08-15 DIAGNOSIS — Z00.00 ROUTINE GENERAL MEDICAL EXAMINATION AT A HEALTH CARE FACILITY: Primary | ICD-10-CM

## 2023-08-15 DIAGNOSIS — R53.83 OTHER FATIGUE: ICD-10-CM

## 2023-08-15 DIAGNOSIS — J30.9 ALLERGIC RHINITIS, UNSPECIFIED SEASONALITY, UNSPECIFIED TRIGGER: Chronic | ICD-10-CM

## 2023-08-15 LAB — TSH SERPL DL<=0.005 MIU/L-ACNC: 2.5 UIU/ML (ref 0.3–4.2)

## 2023-08-15 PROCEDURE — 84443 ASSAY THYROID STIM HORMONE: CPT | Performed by: NURSE PRACTITIONER

## 2023-08-15 PROCEDURE — 99213 OFFICE O/P EST LOW 20 MIN: CPT | Mod: 25 | Performed by: NURSE PRACTITIONER

## 2023-08-15 PROCEDURE — 99396 PREV VISIT EST AGE 40-64: CPT | Performed by: NURSE PRACTITIONER

## 2023-08-15 PROCEDURE — 36415 COLL VENOUS BLD VENIPUNCTURE: CPT | Performed by: NURSE PRACTITIONER

## 2023-08-15 ASSESSMENT — ENCOUNTER SYMPTOMS
PARESTHESIAS: 0
SHORTNESS OF BREATH: 0
SORE THROAT: 0
WEAKNESS: 1
FEVER: 0
MYALGIAS: 1
HEADACHES: 0
CHILLS: 0
ARTHRALGIAS: 1
PALPITATIONS: 0
HEMATOCHEZIA: 0
HEMATURIA: 0
BREAST MASS: 0
NAUSEA: 0
ABDOMINAL PAIN: 0
DIARRHEA: 0
DIZZINESS: 0
CONSTIPATION: 0
EYE PAIN: 0
FREQUENCY: 0
NERVOUS/ANXIOUS: 0
COUGH: 0
JOINT SWELLING: 0
DYSURIA: 0
HEARTBURN: 0

## 2023-08-15 ASSESSMENT — PAIN SCALES - GENERAL: PAINLEVEL: NO PAIN (0)

## 2023-08-15 NOTE — PATIENT INSTRUCTIONS
Preventive Health Recommendations  Female Ages 50 - 64    Yearly exam: See your health care provider every year in order to  Review health changes.   Discuss preventive care.    Review your medicines if your doctor has prescribed any.     You do not need a Pap test if your uterus was removed (hysterectomy) and you have not had cancer.  You should be tested each year for STDs (sexually transmitted diseases) if you're at risk.   Have a mammogram every 1 years.  Have a colonoscopy at age 50, or have a yearly FIT test (stool test). These exams screen for colon cancer.    Have a cholesterol test every 5 years, or more often if advised.  Have a diabetes test (fasting glucose) every three years. If you are at risk for diabetes, you should have this test more often.   If you are at risk for osteoporosis (brittle bone disease), think about having a bone density scan (DEXA).    Shots: Get a flu shot each year. Get a tetanus shot every 10 years.    Nutrition:   Eat at least 5 servings of fruits and vegetables each day.  Eat whole-grain bread, whole-wheat pasta and brown rice instead of white grains and rice.  Get adequate Calcium 1200 mg and Vitamin D 2440-2581 international unit(s) daily.     Lifestyle  Exercise at least 150 minutes a week (30 minutes a day, 5 days a week). This will help you control your weight and prevent disease.  Limit alcohol to one drink per day.  No smoking.   Wear sunscreen to prevent skin cancer.   See your dentist every six months for an exam and cleaning.  See your eye doctor every 1 to 2 years.

## 2023-08-15 NOTE — PROGRESS NOTES
SUBJECTIVE:   CC: Deneen is an 52 year old who presents for preventive health visit.       8/15/2023     8:58 AM   Additional Questions   Roomed by mb   Accompanied by self         8/15/2023     8:58 AM   Patient Reported Additional Medications   Patient reports taking the following new medications none       Healthy Habits:     Getting at least 3 servings of Calcium per day:  Yes    Bi-annual eye exam:  Yes    Dental care twice a year:  Yes    Sleep apnea or symptoms of sleep apnea:  None    Diet:  Diabetic and Other    Frequency of exercise:  6-7 days/week    Duration of exercise:  15-30 minutes    Taking medications regularly:  Not Applicable    Medication side effects:  None    Additional concerns today:  No    Today's PHQ-2 Score:       8/15/2023     8:57 AM   PHQ-2 (  [employer])   Q1: Little interest or pleasure in doing things 0   Q2: Feeling down, depressed or hopeless 0   PHQ-2 Score 0   Q1: Little interest or pleasure in doing things Not at all   Q2: Feeling down, depressed or hopeless Not at all   PHQ-2 Score 0     Have you ever done Advance Care Planning? (For example, a Health Directive, POLST, or a discussion with a medical provider or your loved ones about your wishes): No, advance care planning information given to patient to review.  Patient plans to discuss their wishes with loved ones or provider.      Social History     Tobacco Use    Smoking status: Former     Packs/day: 0.50     Types: Cigarettes     Quit date: 1998     Years since quittin.8    Smokeless tobacco: Never   Substance Use Topics    Alcohol use: No         8/15/2023     8:55 AM   Alcohol Use   Prescreen: >3 drinks/day or >7 drinks/week? Not Applicable     Reviewed orders with patient.  Reviewed health maintenance and updated orders accordingly - Yes  Lab work is in process  Labs reviewed in EPIC  BP Readings from Last 3 Encounters:   08/15/23 128/68   22 95/64   22 104/72    Wt Readings from Last 3  Pain due to rib fractures can extend to the front. The xray report should already be there on [facility]. There is nothing else I can do to make it appear on [facility]. We can mail him a report if he wishes. Encounters:   08/15/23 65 kg (143 lb 6.4 oz)   22 67.6 kg (149 lb 0.5 oz)   22 66.2 kg (146 lb)                  Patient Active Problem List   Diagnosis    Abnormal glandular Papanicolaou smear of cervix    Allergic rhinitis    S/P laparoscopic hysterectomy     Past Surgical History:   Procedure Laterality Date    COLONOSCOPY N/A 2022    Procedure: COLONOSCOPY;  Surgeon: Gadiel Roblero MD;  Location: [facility]    GYN SURGERY      HYSTERECTOMY, PAP NO LONGER INDICATED      2016    LAPAROSCOPIC HERNIORRHAPHY INGUINAL BILATERAL Bilateral 2018    Procedure: LAPAROSCOPIC HERNIORRHAPHY INGUINAL BILATERAL;  Laparoscopic Bilateral Inguinal Hernia Repair ;  Surgeon: Gadiel Roblero MD;  Location: [facility]       Social History     Tobacco Use    Smoking status: Former     Packs/day: 0.50     Types: Cigarettes     Quit date: 1998     Years since quittin.8    Smokeless tobacco: Never   Substance Use Topics    Alcohol use: No     Family History   Problem Relation Age of Onset    Thyroid Disease Mother     Asthma Mother     Suicide Father     Suicide Sister     Thyroid Disease Sister     Depression Daughter          Current Outpatient Medications   Medication Sig Dispense Refill    B Complex-C (VITAMIN B COMPLEX W/VITAMIN C) TABS tablet Take 1 tablet by mouth daily      CALCIUM PO 1 tablet daily when she remembers , unknown dose      cetirizine (ZYRTEC) 10 MG tablet Take 10 mg by mouth daily      Cholecalciferol (VITAMIN D3) 50 MCG ( UT) CAPS Take by mouth daily      EPINEPHrine (ANY BX GENERIC EQUIV) 0.3 MG/0.3ML injection 2-pack Inject 0.3 mLs (0.3 mg) into the muscle as needed for anaphylaxis 2 each 3    melatonin 1 MG CAPS Take 7.5 mg by mouth      Multiple Vitamins-Minerals (MULTIVITAMIN PO)       Omega-3 Fish Oil 500 MG capsule Take 1 capsule (500 mg) by mouth daily      Probiotic Product (PRO-BIOTIC BLEND PO)        Allergies   Allergen Reactions    Orange Fruit [Citrus] Anaphylaxis     Tomatoes [Tomato] Anaphylaxis    Sulfa Antibiotics Swelling     PN: eyes swelled    Trimethoprim      PN: eyes swelling / itching       Breast Cancer Screenin/11/2021     7:10 AM   Breast CA Risk Assessment (FHS-7)   Do you have a family history of breast, colon, or ovarian cancer? No / Unknown       Mammogram Screening: Recommended annual mammography  Pertinent mammograms are reviewed under the imaging tab.    History of abnormal Pap smear: Status post benign hysterectomy. Health Maintenance and Surgical History updated.     Reviewed and updated as needed this visit by clinical staff   Tobacco  Allergies  Meds  Problems  Med Hx  Surg Hx  Fam Hx  Soc   Hx        Reviewed and updated as needed this visit by Provider   Tobacco  Allergies  Meds  Problems  Med Hx  Surg Hx  Fam Hx  Soc   Hx       History reviewed. No pertinent past medical history.   Past Surgical History:   Procedure Laterality Date    COLONOSCOPY N/A 2022    Procedure: COLONOSCOPY;  Surgeon: Gadiel Roblero MD;  Location: [facility]    GYN SURGERY      HYSTERECTOMY, PAP NO LONGER INDICATED      2016    LAPAROSCOPIC HERNIORRHAPHY INGUINAL BILATERAL Bilateral 2018    Procedure: LAPAROSCOPIC HERNIORRHAPHY INGUINAL BILATERAL;  Laparoscopic Bilateral Inguinal Hernia Repair ;  Surgeon: Gadiel Roblero MD;  Location: [facility]       Review of Systems   Constitutional:  Negative for chills and fever.   HENT:  Negative for congestion, ear pain, hearing loss and sore throat.    Eyes:  Negative for pain and visual disturbance.   Respiratory:  Negative for cough and shortness of breath.    Cardiovascular:  Negative for chest pain, palpitations and peripheral edema.   Gastrointestinal:  Negative for abdominal pain, constipation, diarrhea, heartburn, hematochezia and nausea.   Breasts:  Negative for tenderness, breast mass and discharge.   Genitourinary:  Negative for dysuria, frequency, genital sores, hematuria, pelvic pain,  "urgency, vaginal bleeding and vaginal discharge.   Musculoskeletal:  Positive for arthralgias and myalgias. Negative for joint swelling.   Skin:  Negative for rash.   Neurological:  Positive for weakness. Negative for dizziness, headaches and paresthesias.   Psychiatric/Behavioral:  Negative for mood changes. The patient is not nervous/anxious.      She has muscle in the left buttock/top of the posterior leg that will throb after running or sit in the car too long.  Gets up and moves to keep this from being a problem.  Rubs it out but is able to get this under control.    Weakness in hands with having soreness around the thumb MPJ joint and unable to open jars when this occurs.  Tired throughout the day but better now that she is walking.     OBJECTIVE:   /68   Pulse 73   Temp 97.7  F (36.5  C) (Tympanic)   Resp 16   Ht 1.572 m (5' 1.89\")   Wt 65 kg (143 lb 6.4 oz)   SpO2 98%   BMI 26.32 kg/m    Physical Exam  GENERAL: healthy, alert and no distress  EYES: Eyes grossly normal to inspection, PERRL and conjunctivae and sclerae normal  HENT: ear canals and TM's normal, nose and mouth without ulcers or lesions  NECK: no adenopathy, no asymmetry, masses, or scars and thyroid normal to palpation  RESP: lungs clear to auscultation - no rales, rhonchi or wheezes  BREAST: normal without masses, tenderness or nipple discharge and no palpable axillary masses or adenopathy  CV: regular rate and rhythm, normal S1 S2, no S3 or S4, no murmur, click or rub, no peripheral edema and peripheral pulses strong  ABDOMEN: soft, nontender, no hepatosplenomegaly, no masses and bowel sounds normal  MS: no gross musculoskeletal defects noted, no edema  SKIN: no suspicious lesions or rashes  NEURO: Normal strength and tone, mentation intact and speech normal  PSYCH: mentation appears normal, affect normal/bright  LYMPH: normal ant/post cervical, supraclavicular nodes    Diagnostic Test Results:  Labs reviewed in " Epic    ASSESSMENT/PLAN:   (Z00.00) Routine general medical examination at a health care facility  (primary encounter diagnosis)  Comment: Patient declines further COVID 19 vaccinations.  She is up to date on all other vaccines.  Mammogram ordered for screening.  No other screenings indicated at this time.  Reviewed preventative care handout.  Follow-up recommended in 1 year.  Plan: REVIEW OF HEALTH MAINTENANCE PROTOCOL ORDERS,         PRIMARY CARE FOLLOW-UP SCHEDULING          (Z12.31) Visit for screening mammogram  Plan: MA SCREENING DIGITAL BILAT - Future  (s+30)    (J30.9) Allergic rhinitis, unspecified seasonality, unspecified trigger  Comment: Patient is stable on her Zyrtec.  Would refill her epi pens as needed for the next year.  She did not need refill today.    (R53.83) Other fatigue  Comment: Hx of thyroid issues in sister and mother.  Has been having increase in  fatigue and hair changes with eye brows.  TSH ordered for evaluation.  I will notify her of results.  Plan: TSH with free T4 reflex    Patient has been advised of split billing requirements and indicates understanding: Yes    COUNSELING:  Reviewed preventive health counseling, as reflected in patient instructions       Regular exercise       Healthy diet/nutrition       Vision screening       Immunizations  Up to date       Osteoporosis prevention/bone health       Advance Care Planning    She reports that she quit smoking about 24 years ago. She smoked an average of .5 packs per day. She has never used smokeless tobacco.    Olena Fernandes NP  [facility]

## 2023-08-18 DIAGNOSIS — Z91.018 FOOD ALLERGY: ICD-10-CM

## 2023-08-18 RX ORDER — EPINEPHRINE 0.3 MG/.3ML
0.3 INJECTION SUBCUTANEOUS PRN
Qty: 2 EACH | Refills: 3 | Status: SHIPPED | OUTPATIENT
Start: 2023-08-18

## 2023-08-18 NOTE — TELEPHONE ENCOUNTER
Prescription approved per [facility] Refill Protocol.    Wendy OSEI RN  Specialty/Allergy Clinics

## 2023-08-18 NOTE — TELEPHONE ENCOUNTER
Received refill request from [facility].    Prescriber:  Dr Phan    Medication:  EPINEPHrine (ANY BX GENERIC EQUIV) 0.3 MG/0.3ML injection 2-pack     [facility]  [facility]

## 2023-08-18 NOTE — TELEPHONE ENCOUNTER
Medication Question or Refill        What medication are you calling about (include dose and sig)?: EPINEPHRINE (ANY BX GENERIC EQUIV) 0.3 mg /0.3ML injection 2-pack    Preferred Pharmacy:   [facility] [address] [place] [address] [address]  Phone: [phone] Fax: [phone]  Controlled Substance Agreement on file:   CSA -- Patient Level:    CSA: None found at the patient level.       Who prescribed the medication?: [facility]ORN    Do you need a refill? Yes,     When did you use the medication last? NEED NEW RX WRITTEN PLEASE    Patient offered an appointment? No    Do you have any questions or concerns?  No      Could we send this information to you in [employer] or would you prefer to receive a phone call?:   Patient would like to be contacted via [employer]

## 2023-11-16 ENCOUNTER — OFFICE VISIT (OUTPATIENT)
Dept: ALLERGY | Facility: CLINIC | Age: 53
End: 2023-11-16
Payer: COMMERCIAL

## 2023-11-16 VITALS
BODY MASS INDEX: 26.17 KG/M2 | SYSTOLIC BLOOD PRESSURE: 90 MMHG | DIASTOLIC BLOOD PRESSURE: 65 MMHG | HEART RATE: 64 BPM | WEIGHT: 142.6 LBS | TEMPERATURE: 98.1 F | OXYGEN SATURATION: 97 %

## 2023-11-16 DIAGNOSIS — J30.1 SEASONAL ALLERGIC RHINITIS DUE TO POLLEN: ICD-10-CM

## 2023-11-16 DIAGNOSIS — T78.49XS OTHER ALLERGY, SEQUELA: Primary | ICD-10-CM

## 2023-11-16 DIAGNOSIS — L50.9 URTICARIA: ICD-10-CM

## 2023-11-16 PROCEDURE — 99213 OFFICE O/P EST LOW 20 MIN: CPT | Mod: 25 | Performed by: ALLERGY & IMMUNOLOGY

## 2023-11-16 PROCEDURE — 36415 COLL VENOUS BLD VENIPUNCTURE: CPT | Performed by: ALLERGY & IMMUNOLOGY

## 2023-11-16 PROCEDURE — 86003 ALLG SPEC IGE CRUDE XTRC EA: CPT | Performed by: ALLERGY & IMMUNOLOGY

## 2023-11-16 PROCEDURE — 82785 ASSAY OF IGE: CPT | Performed by: ALLERGY & IMMUNOLOGY

## 2023-11-16 PROCEDURE — 95004 PERQ TESTS W/ALRGNC XTRCS: CPT | Performed by: ALLERGY & IMMUNOLOGY

## 2023-11-16 ASSESSMENT — ENCOUNTER SYMPTOMS
COUGH: 0
EYE DISCHARGE: 0
EYE REDNESS: 0
RHINORRHEA: 0
SINUS PRESSURE: 0
ACTIVITY CHANGE: 0
CHILLS: 0
CHEST TIGHTNESS: 0
EYE ITCHING: 0
WHEEZING: 0
SHORTNESS OF BREATH: 0
SORE THROAT: 0
FEVER: 0
FACIAL SWELLING: 0

## 2023-11-16 NOTE — NURSING NOTE
Per provider verbal order, RN placed positive control, negative control, Fresh Tomato x 2 and Fresh Orange x 2 scratch test.  Consent was obtained prior to procedure.  Once scratch test(s) were placed, patient was monitored for 15 minutes in clinic.  RN read test after 15 minutes and provider was notified of results.  Pt tolerated procedure well.  All questions and concerns were addressed at office visit.     Wendy OSEI   Allergy [facility]

## 2023-11-16 NOTE — PATIENT INSTRUCTIONS
Prescription Assistance  If you need assistance with your prescriptions (cost, coverage, etc) please contact: [facility] [phone]           If labs have been ordered/completed, please allow 7-14 business days for final interpretation of results to be sent on My Chart, phone or mail. Some lab results can take up to 28 days for results.         Allergy Staff Appt Hours Shot Hours Locations    Physician      Bret Phan MD         Support Staff      Wendy Villegas MA     Tuesday:   [place] :  [place]: :         :  [place] 7-3     [place]        Tuesday: : : :10        Tuesday: :10        Thursday[place]     [facility] [place]ues & Wed: :10       Thurs: 12:10       Fri:             [facility]  [address] [place] [address]  Appt Line: [phone]        [facility]  [address] [place] [address]  Appt Line: [phone]     Pulmonary Function Scheduling:  [place]: [phone]  [place]: [phone]  [phone]        negative - no dysuria

## 2023-11-16 NOTE — LETTER
11/16/2023         RE: Deneen Hyde  [address] [place] [id number]        Dear Colleague,    Thank you for referring your patient, Deneen Hyde, to the [facility]. Please see a copy of my visit note below.    SUBJECTIVE:                                                                   Deneen Hyde presents today to our Allergy Clinic at [facility] for a follow up visit. She is a 53 year old female with a history of food allergies.  Reaction to food  Multiple years ago, she had an episode of eating tomatoes and soon after developing difficulty breathing, shortness of breath, sweating, generalized itchiness.  She also found that she had thickened skin of her cheeks. She was seen in the [facility] and was treated with epinephrine.  She has been avoiding tomatoes since then. She had similar episodes after eating oranges.  Those episodes were treated with oral diphenhydramine and oral steroids.  Serum IgE for egg, tomato, onion, and mushroom  was negative in 2009.  The patient mentioned in 2022, that she did not have any issues eating mushrooms, eggs, or onions.  In 2022, negative serum IgE for tomato and slight sensitivity to orange.   Allergic rhinitis  Has a history of allergic rhinitis, as she was previously evaluated [facility], allergy.  According to the notes, allergy testing in 2008 was positive for dust mites, tree pollen, grass pollen, and ragweed. In August 2022, serum IgE was within normal limits.  Sensitivity to maple tree pollen was noted.  Also mild sensitivity to dust mites and pollen of [place]. The rest was negative.  Urticaria  Apparently, she also had some unexplained episodes of pruritus of the skin and possible urticaria with angioedema. Had serum tryptase level done in the past.  In 2012, it was 3.4, approximately 5 hours after the symptom onset. On daily cetirizine for years.    In 2022, I suggested SPT with  fresh tomato and fresh orange. She is here for that reason.  She continues avoiding both tomatoes and oranges.  She was able to stop cetirizine for more than 1 week.  She has not had any episodes of skin pruritus, urticaria or angioedema since then.    She denies any significant rhinoconjunctivitis symptoms.       Patient Active Problem List   Diagnosis     Abnormal glandular Papanicolaou smear of cervix     Allergic rhinitis     S/P laparoscopic hysterectomy       History reviewed. No pertinent past medical history.   Problem (# of Occurrences) Relation (Name,Age of Onset)    Suicide (2) Father, Sister    Asthma (1) Mother    Depression (1) Daughter    Thyroid Disease (2) Mother, Sister          Past Surgical History:   Procedure Laterality Date     COLONOSCOPY N/A 2022    Procedure: COLONOSCOPY;  Surgeon: Gadiel Roblero MD;  Location: [facility]     GYN SURGERY       HYSTERECTOMY, PAP NO LONGER INDICATED      2016     LAPAROSCOPIC HERNIORRHAPHY INGUINAL BILATERAL Bilateral 2018    Procedure: LAPAROSCOPIC HERNIORRHAPHY INGUINAL BILATERAL;  Laparoscopic Bilateral Inguinal Hernia Repair ;  Surgeon: Gadiel Roblero MD;  Location: [facility]     Social History     Socioeconomic History     Marital status:      Spouse name: None     Number of children: None     Years of education: None     Highest education level: None   Occupational History     Employer: [employer]     Comment: CURRENTLY ON LEAVE     Occupation:      Comment: [employer]   Tobacco Use     Smoking status: Former     Packs/day: .5     Types: Cigarettes     Quit date: 1998     Years since quittin.0     Smokeless tobacco: Never   Vaping Use     Vaping Use: Never used   Substance and Sexual Activity     Alcohol use: No     Drug use: No     Sexual activity: Yes     Partners: Male     Birth control/protection: Female Surgical     Comment: hysterectomy    Other Topics Concern      Parent/sibling w/ CABG, MI or angioplasty before 65F 55M? No   Social History Narrative    August 11, 2022    ENVIRONMENTAL HISTORY: [place] home is heated with a forced air. They do have central air conditioning. The patient's bedroom is furnished with carpeting in bedroom, fabric window coverings, and animals sleep in bedroom.  Pets inside the house include 2 dog(s) and 1 Getco. There is history of mice infestation. There is/are 0 smokers in the house.  The house does not have a basement, only a crawl space.            Review of Systems   Constitutional:  Negative for activity change, chills and fever.   HENT:  Negative for congestion, dental problem, ear pain, facial swelling, nosebleeds, postnasal drip, rhinorrhea, sinus pressure, sneezing and sore throat.    Eyes:  Negative for discharge, redness and itching.   Respiratory:  Negative for cough, chest tightness, shortness of breath and wheezing.    Skin:  Negative for rash.   Allergic/Immunologic: Positive for food allergies.           Current Outpatient Medications:      B Complex-C (VITAMIN B COMPLEX W/VITAMIN C) TABS tablet, Take 1 tablet by mouth daily, Disp: , Rfl:      CALCIUM PO, 1 tablet daily when she remembers , unknown dose, Disp: , Rfl:      Cholecalciferol (VITAMIN D3) 50 MCG (2000 UT) CAPS, Take by mouth daily, Disp: , Rfl:      melatonin 1 MG CAPS, Take 7.5 mg by mouth, Disp: , Rfl:      Multiple Vitamins-Minerals (MULTIVITAMIN PO), , Disp: , Rfl:      Omega-3 Fish Oil 500 MG capsule, Take 1 capsule (500 mg) by mouth daily, Disp: , Rfl:      Probiotic Product (PRO-BIOTIC BLEND PO), , Disp: , Rfl:      cetirizine (ZYRTEC) 10 MG tablet, Take 10 mg by mouth daily (Patient not taking: Reported on 11/16/2023), Disp: , Rfl:      EPINEPHrine (ANY BX GENERIC EQUIV) 0.3 MG/0.3ML injection 2-pack, Inject 0.3 mLs (0.3 mg) into the muscle as needed for anaphylaxis (Patient not taking: Reported on 11/16/2023),  Disp: 2 each, Rfl: 3  Immunization History   Administered Date(s) Administered     COVID-19 MONOVALENT 12+ (Pfizer) 08/12/2021, 09/02/2021     TD,PF 7+ (Tenivac) 05/07/1999     TDAP (Adacel,Boostrix) 11/11/2021     Td (Adult), Adsorbed 06/14/2011     Allergies   Allergen Reactions     Orange Fruit [Citrus] Anaphylaxis     Tomatoes [Tomato] Anaphylaxis     Sulfa Antibiotics Swelling     PN: eyes swelled     Trimethoprim      PN: eyes swelling / itching     OBJECTIVE:                                                                 BP 90/65 (BP Location: Left arm, Patient Position: Sitting, Cuff Size: Adult Regular)   Pulse 64   Temp 98.1  F (36.7  C) (Tympanic)   Wt 64.7 kg (142 lb 9.6 oz)   SpO2 97%   BMI 26.17 kg/m          Physical Exam  Vitals and nursing note reviewed.   Constitutional:       General: She is not in acute distress.     Appearance: She is not ill-appearing, toxic-appearing or diaphoretic.   HENT:      Head: Normocephalic and atraumatic.      Right Ear: Tympanic membrane, ear canal and external ear normal.      Left Ear: Tympanic membrane, ear canal and external ear normal.      Nose: Septal deviation (Small-medium spur to the right) present. No mucosal edema, congestion or rhinorrhea.      Right Turbinates: Not enlarged.      Left Turbinates: Not enlarged.      Mouth/Throat:      Lips: Pink.      Mouth: Mucous membranes are moist.      Pharynx: Oropharynx is clear. No pharyngeal swelling, oropharyngeal exudate, posterior oropharyngeal erythema or uvula swelling.   Eyes:      General:         Right eye: No discharge.         Left eye: No discharge.      Conjunctiva/sclera: Conjunctivae normal.   Cardiovascular:      Rate and Rhythm: Normal rate and regular rhythm.      Heart sounds: Normal heart sounds. No murmur heard.  Pulmonary:      Effort: Pulmonary effort is normal. No respiratory distress.      Breath sounds: Normal breath sounds and air entry. No stridor, decreased air movement or  transmitted upper airway sounds. No decreased breath sounds, wheezing, rhonchi or rales.   Musculoskeletal:      Cervical back: Normal range of motion.   Neurological:      Mental Status: She is alert and oriented to person, place, and time.   Psychiatric:         Mood and Affect: Mood normal.         Behavior: Behavior normal.         WORKUP:     At today's visit, the patient and I engaged in an informed consent discussion about allergy testing.  We discussed skin testing, blood testing, and the alternative of not undergoing any testing. The patient has a preference for skin testing. We then discussed the risks and benefits of skin testing.  The patient understands skin testing risks can include, but are not limited to, urticaria, angioedema, shortness of breath, and severe anaphylaxis.  The benefits include, but are not limited, to evaluation for allergens causing symptoms.  After answering the patient's questions they have agreed to proceed with skin testing.    FOOD ALLERGEN PERCUTANEOUS SKIN TESTING      11/16/2023     4:00 PM   [facility] [employer]    Consent Y   Ordering Physician Sylvester   Interpreting Physician Sylvester   Testing Technician Wendy OSEI RN   Location Back   Time start: 16:45   Time End: 17:00   Positive Control: Histatrol*ALK 1 mg/ml 3/5   Negative Control: 50% Glycerin**[place] 0   Other Food(s) Fresh Tomato   Reaction (W/F in millimeters) 0   Other Food(s) Fresh Tomato   Reaction (W/F in millimeters) 0   Other Food(s) Fresh Orange   Reaction (W/F in millimeters) 0   Other Food(s) Fresh Orange   Reaction (W/F in millimeters) 2/10         My interpretation: SPT using fresh tomato and fresh orange, placed in duplicates, using prick to prick method, was negative for tomato.  1 test with fresh orange was negative.  Another 1 was borderline positive.  The patient had appropriate responses to positive and negative controls.      ASSESSMENT/PLAN:    Other allergy, sequela  Reassuring skin test  results.  - Ordered interval serum IgE for tomato and orange.  Depending on results, anticipate oral food challenge test in the office setting.      - ALLERGY SKIN TESTS,ALLERGENS  - IgE  - Allergen tomato IgE  - Allergen orange IgE      Urticaria    She has been off cetirizine for more than a week.  So far, she is asymptomatic.  - I would recommend to hold all oral antihistamines and monitor symptoms.    Seasonal allergic rhinitis due to pollen  Currently asymptomatic.  - Continue monitoring symptoms.  She can take cetirizine 10 mg by mouth once daily as needed.     The timeframe of the follow-up will depend on in vitro studies.  Likely, anticipate oral food challenge test in the office setting.    Thank you for allowing us to participate in the care of this patient. Please feel free to contact us if there are any questions or concerns about the patient.    Disclaimer: This note consists of symbols derived from keyboarding, dictation and/or voice recognition software. As a result, there may be errors in the script that have gone undetected. Please consider this when interpreting information found in this chart.    Bret Phan MD, FAAAAI, FACELISEOI  Allergy, Asthma and Immunology     [facility]        Again, thank you for allowing me to participate in the care of your patient.        Sincerely,        Bret Phan MD

## 2023-11-16 NOTE — PROGRESS NOTES
SUBJECTIVE:                                                                   Deneen Hyde presents today to our Allergy Clinic at [facility] for a follow up visit. She is a 53 year old female with a history of food allergies.  Reaction to food  Multiple years ago, she had an episode of eating tomatoes and soon after developing difficulty breathing, shortness of breath, sweating, generalized itchiness.  She also found that she had thickened skin of her cheeks. She was seen in the [facility] clinic and was treated with epinephrine.  She has been avoiding tomatoes since then. She had similar episodes after eating oranges.  Those episodes were treated with oral diphenhydramine and oral steroids.  Serum IgE for egg, tomato, onion, and mushroom  was negative in 2009.  The patient mentioned in 2022, that she did not have any issues eating mushrooms, eggs, or onions.  In 2022, negative serum IgE for tomato and slight sensitivity to orange.   Allergic rhinitis  Has a history of allergic rhinitis, as she was previously evaluated [facility], allergy.  According to the notes, allergy testing in 2008 was positive for dust mites, tree pollen, grass pollen, and ragweed. In August 2022, serum IgE was within normal limits.  Sensitivity to maple tree pollen was noted.  Also mild sensitivity to dust mites and pollen of [place]. The rest was negative.  Urticaria  Apparently, she also had some unexplained episodes of pruritus of the skin and possible urticaria with angioedema. Had serum tryptase level done in the past.  In 2012, it was 3.4, approximately 5 hours after the symptom onset. On daily cetirizine for years.    In 2022, I suggested SPT with fresh tomato and fresh orange. She is here for that reason.  She continues avoiding both tomatoes and oranges.  She was able to stop cetirizine for more than 1 week.  She has not had any episodes of skin pruritus, urticaria or angioedema since  then.    She denies any significant rhinoconjunctivitis symptoms.       Patient Active Problem List   Diagnosis    Abnormal glandular Papanicolaou smear of cervix    Allergic rhinitis    S/P laparoscopic hysterectomy       History reviewed. No pertinent past medical history.   Problem (# of Occurrences) Relation (Name,Age of Onset)    Suicide (2) Father, Sister    Asthma (1) Mother    Depression (1) Daughter    Thyroid Disease (2) Mother, Sister          Past Surgical History:   Procedure Laterality Date    COLONOSCOPY N/A 2022    Procedure: COLONOSCOPY;  Surgeon: Gadiel Roblero MD;  Location: [facility]    GYN SURGERY      HYSTERECTOMY, PAP NO LONGER INDICATED      2016    LAPAROSCOPIC HERNIORRHAPHY INGUINAL BILATERAL Bilateral 2018    Procedure: LAPAROSCOPIC HERNIORRHAPHY INGUINAL BILATERAL;  Laparoscopic Bilateral Inguinal Hernia Repair ;  Surgeon: Gadiel Roblero MD;  Location: [facility]     Social History     Socioeconomic History    Marital status:      Spouse name: None    Number of children: None    Years of education: None    Highest education level: None   Occupational History     Employer: [employer] [address]     Comment: CURRENTLY ON LEAVE    Occupation:      Comment: [employer] EMPLOYED   Tobacco Use    Smoking status: Former     Packs/day: .5     Types: Cigarettes     Quit date: 1998     Years since quittin.0    Smokeless tobacco: Never   Vaping Use    Vaping Use: Never used   Substance and Sexual Activity    Alcohol use: No    Drug use: No    Sexual activity: Yes     Partners: Male     Birth control/protection: Female Surgical     Comment: hysterectomy    Other Topics Concern    Parent/sibling w/ CABG, MI or angioplasty before 65F 55M? No   Social History Narrative    2022    ENVIRONMENTAL HISTORY: [place] The home is heated with a forced air. They do have central air conditioning.  The patient's bedroom is furnished with carpeting in bedroom, fabric window coverings, and animals sleep in bedroom.  Pets inside the house include 2 dog(s) and 1 Getco. There is history of mice infestation. There is/are 0 smokers in the house.  The house does not have a basement, only a crawl space.            Review of Systems   Constitutional:  Negative for activity change, chills and fever.   HENT:  Negative for congestion, dental problem, ear pain, facial swelling, nosebleeds, postnasal drip, rhinorrhea, sinus pressure, sneezing and sore throat.    Eyes:  Negative for discharge, redness and itching.   Respiratory:  Negative for cough, chest tightness, shortness of breath and wheezing.    Skin:  Negative for rash.   Allergic/Immunologic: Positive for food allergies.           Current Outpatient Medications:     B Complex-C (VITAMIN B COMPLEX W/VITAMIN C) TABS tablet, Take 1 tablet by mouth daily, Disp: , Rfl:     CALCIUM PO, 1 tablet daily when she remembers , unknown dose, Disp: , Rfl:     Cholecalciferol (VITAMIN D3) 50 MCG (2000 UT) CAPS, Take by mouth daily, Disp: , Rfl:     melatonin 1 MG CAPS, Take 7.5 mg by mouth, Disp: , Rfl:     Multiple Vitamins-Minerals (MULTIVITAMIN PO), , Disp: , Rfl:     Omega-3 Fish Oil 500 MG capsule, Take 1 capsule (500 mg) by mouth daily, Disp: , Rfl:     Probiotic Product (PRO-BIOTIC BLEND PO), , Disp: , Rfl:     cetirizine (ZYRTEC) 10 MG tablet, Take 10 mg by mouth daily (Patient not taking: Reported on 11/16/2023), Disp: , Rfl:     EPINEPHrine (ANY BX GENERIC EQUIV) 0.3 MG/0.3ML injection 2-pack, Inject 0.3 mLs (0.3 mg) into the muscle as needed for anaphylaxis (Patient not taking: Reported on 11/16/2023), Disp: 2 each, Rfl: 3  Immunization History   Administered Date(s) Administered    COVID-19 MONOVALENT 12+ (Pfizer) 08/12/2021, 09/02/2021    TD,PF 7+ (Tenivac) 05/07/1999    TDAP (Adacel,Boostrix) 11/11/2021    Td (Adult), Adsorbed 06/14/2011     Allergies   Allergen  Reactions    Orange Fruit [Citrus] Anaphylaxis    Tomatoes [Tomato] Anaphylaxis    Sulfa Antibiotics Swelling     PN: eyes swelled    Trimethoprim      PN: eyes swelling / itching     OBJECTIVE:                                                                 BP 90/65 (BP Location: Left arm, Patient Position: Sitting, Cuff Size: Adult Regular)   Pulse 64   Temp 98.1  F (36.7  C) (Tympanic)   Wt 64.7 kg (142 lb 9.6 oz)   SpO2 97%   BMI 26.17 kg/m          Physical Exam  Vitals and nursing note reviewed.   Constitutional:       General: She is not in acute distress.     Appearance: She is not ill-appearing, toxic-appearing or diaphoretic.   HENT:      Head: Normocephalic and atraumatic.      Right Ear: Tympanic membrane, ear canal and external ear normal.      Left Ear: Tympanic membrane, ear canal and external ear normal.      Nose: Septal deviation (Small-medium spur to the right) present. No mucosal edema, congestion or rhinorrhea.      Right Turbinates: Not enlarged.      Left Turbinates: Not enlarged.      Mouth/Throat:      Lips: Pink.      Mouth: Mucous membranes are moist.      Pharynx: Oropharynx is clear. No pharyngeal swelling, oropharyngeal exudate, posterior oropharyngeal erythema or uvula swelling.   Eyes:      General:         Right eye: No discharge.         Left eye: No discharge.      Conjunctiva/sclera: Conjunctivae normal.   Cardiovascular:      Rate and Rhythm: Normal rate and regular rhythm.      Heart sounds: Normal heart sounds. No murmur heard.  Pulmonary:      Effort: Pulmonary effort is normal. No respiratory distress.      Breath sounds: Normal breath sounds and air entry. No stridor, decreased air movement or transmitted upper airway sounds. No decreased breath sounds, wheezing, rhonchi or rales.   Musculoskeletal:      Cervical back: Normal range of motion.   Neurological:      Mental Status: She is alert and oriented to person, place, and time.   Psychiatric:         Mood and Affect:  Mood normal.         Behavior: Behavior normal.         WORKUP:     At today's visit, the patient and I engaged in an informed consent discussion about allergy testing.  We discussed skin testing, blood testing, and the alternative of not undergoing any testing. The patient has a preference for skin testing. We then discussed the risks and benefits of skin testing.  The patient understands skin testing risks can include, but are not limited to, urticaria, angioedema, shortness of breath, and severe anaphylaxis.  The benefits include, but are not limited, to evaluation for allergens causing symptoms.  After answering the patient's questions they have agreed to proceed with skin testing.    FOOD ALLERGEN PERCUTANEOUS SKIN TESTING      11/16/2023     4:00 PM   [facility]    Consent Y   Ordering Physician Sylvester   Interpreting Physician Sylvester   Testing Technician Wendy OSEI RN   Location Back   Time start: 16:45   Time End: 17:00   Positive Control: Histatrol*ALK 1 mg/ml 3/5   Negative Control: 50% Glycerin**[place]gard 0   Other Food(s) Fresh Tomato   Reaction (W/F in millimeters) 0   Other Food(s) Fresh Tomato   Reaction (W/F in millimeters) 0   Other Food(s) Fresh Orange   Reaction (W/F in millimeters) 0   Other Food(s) Fresh Orange   Reaction (W/F in millimeters) 2/10         My interpretation: SPT using fresh tomato and fresh orange, placed in duplicates, using prick to prick method, was negative for tomato.  1 test with fresh orange was negative.  Another 1 was borderline positive.  The patient had appropriate responses to positive and negative controls.      ASSESSMENT/PLAN:    Other allergy, sequela  Reassuring skin test results.  - Ordered interval serum IgE for tomato and orange.  Depending on results, anticipate oral food challenge test in the office setting.      - ALLERGY SKIN TESTS,ALLERGENS  - IgE  - Allergen tomato IgE  - Allergen orange IgE      Urticaria    She has been off cetirizine for more  than a week.  So far, she is asymptomatic.  - I would recommend to hold all oral antihistamines and monitor symptoms.    Seasonal allergic rhinitis due to pollen  Currently asymptomatic.  - Continue monitoring symptoms.  She can take cetirizine 10 mg by mouth once daily as needed.     The timeframe of the follow-up will depend on in vitro studies.  Likely, anticipate oral food challenge test in the office setting.    Thank you for allowing us to participate in the care of this patient. Please feel free to contact us if there are any questions or concerns about the patient.    Disclaimer: This note consists of symbols derived from keyboarding, dictation and/or voice recognition software. As a result, there may be errors in the script that have gone undetected. Please consider this when interpreting information found in this chart.    Bret Phan MD, FAAAAI, FACAAI  Allergy, Asthma and Immunology     [facility]

## 2023-11-21 LAB
ORANGE IGE QN: <0.1 KU(A)/L
TOMATO IGE QN: <0.1 KU(A)/L

## 2023-11-22 LAB — IGE SERPL-ACNC: 25 KU/L (ref 0–114)

## 2023-11-27 NOTE — RESULT ENCOUNTER NOTE
[facility] message sent:   Total serum IgE is within normal limits.  Negative serum IgE for tomato and orange.  - Recommend oral food challenge test in the office setting with both foods, on separate days.

## 2024-04-01 ENCOUNTER — NURSE TRIAGE (OUTPATIENT)
Dept: FAMILY MEDICINE | Facility: CLINIC | Age: 54
End: 2024-04-01

## 2024-04-01 NOTE — TELEPHONE ENCOUNTER
Referral to Acute and Diagnostic Services    [phone] ([place]) [facility] [place] [address] [place] [address]    Transition to Acute & Diagnostic Services Clinic has been discussed with patient, and she agrees with next level of care.   Patient understands that evaluation/treatment at ADS typically takes significantly longer than in clinic/urgent care (>2 hours).  The [facility] Acute and Diagnostics Services Clinic has been contacted by provider/staff to confirm patient acceptance.         Special issues: None    The following provider has assessed this patient for intervention at [facility], and directed the patient for referral: Lynda Arguello, RN    Nurse Triage SBAR    Is this a 2nd Level Triage? NO    Protocol Recommended Disposition:   See in Office Today or Tomorrow    Recommendation: ADS tomorrow.     Huddled with ADS provider    Does the patient meet one of the following criteria for ADS visit consideration? 16+ years old, with an [facility] PCP     TIP  Providers, please consider if this condition is appropriate for management at one of our Acute and Diagnostic Services sites.     If patient is a good candidate, please use dotphrase <dot>triageresponse and select Refer to ADS to document.     Reason for Disposition   MILD pain that comes and goes (cramps) > 72 hours  (Exception: This same abdominal pain is a chronic symptom recurrent or ongoing AND present > 4 weeks.)    Additional Information   Negative: SEVERE difficulty breathing (e.g., struggling for each breath, speaks in single words)   Negative: Shock suspected (e.g., cold/pale/clammy skin, too weak to stand, low BP, rapid pulse)   Negative: Difficult to awaken or acting confused (e.g., disoriented, slurred speech)   Negative: Passed out (i.e., lost consciousness, collapsed and was not responding)   Negative: Visible sweat on face or sweat is dripping down   Negative: Sounds like a life-threatening emergency to the triager   Negative: Followed an  abdomen (stomach) injury   Negative: Chest pain   Negative: Abdominal pain and pregnant < 20 weeks   Negative: Abdominal pain and pregnant 20 or more weeks   Negative: Abdomen bloating or swelling are main symptoms   Negative: Pain lasting > 10 minutes and over 35 years old and at least one cardiac risk factor (e.g., diabetes, high cholesterol, hypertension, obesity, smoker or strong family history of heart disease)   Negative: Pain lasting > 10 minutes and history of heart disease (i.e., heart attack, bypass surgery, angina, angioplasty, CHF)   Negative: Pain lasts > 10 minutes and difficulty breathing   Negative: Vomiting red blood or black (coffee ground) material  (Exception: Few streaks and only occurred once.)   Negative: Pain lasting > 10 minutes and over 50 years old   Negative: Pain lasting > 10 minutes and over 40 years old and associated chest, arm, neck, upper back, or jaw pain   Negative: SEVERE abdominal pain (e.g., excruciating)   Negative: Blood in bowel movements  (Exception: Blood on surface of BM with constipation.)   Negative: Black or tarry bowel movements  (Exception: Chronic-unchanged black-grey BMs AND is taking iron pills or Pepto-Bismol.)   Negative: Pregnant 20 weeks or more and new hand or face swelling   Negative: MILD TO MODERATE constant pain lasting > 2 hours   Negative: MILD TO MODERATE pain and not relieved by antacid medicine   Negative: Vomiting bile (green color)   Negative: Patient sounds very sick or weak to the triager   Negative: Patient wants to be seen   Negative: Vomiting and abdomen looks much more swollen than usual   Negative: White of the eyes have turned yellow (i.e., jaundice)   Negative: Fever > 103 F (39.4 C)   Negative: Fever > 101 F (38.3 C) and over 60 years of age   Negative: Fever > 100.0 F (37.8 C) and has diabetes mellitus or a weak immune system (e.g., HIV positive, cancer chemotherapy, organ transplant, splenectomy, chronic steroids)   Negative: Fever >  100.0 F (37.8 C) and bedridden (e.g., CVA, chronic illness, recovering from surgery)   Negative: MODERATE pain that comes and goes (cramps) lasts > 24 hours    Answer Assessment - Initial Assessment Questions  1. LOCATION: RUQ  2. RADIATION: Radiates to belly button.  3. ONSET: Saturday.  4. SUDDEN: Comes on suddenly.   5. PATTERN: Comes and goes. Pain lasts for about a minute when it comes on. Some soreness by the belly button, but not RUQ.   6. SEVERITY: MILD (1-3): Doesn't interfere with normal activities, abdomen soft and not tender to touch.  7. RECURRENT SYMPTOM: Denies.  8. AGGRAVATING FACTORS: Bending over.  9. CARDIAC SYMPTOMS: Denies chest pain, difficulty breathing, or sweating. Reports feeling nauseous.   10. OTHER SYMPTOMS: reports lower back pain, felt feverish, but never checked it. Denies diarrhea or painful urination, vomiting.    Protocols used: Abdominal Pain - [facility]    Lynda HERNÁNDEZ RN  [facility]  [phone]

## 2024-04-02 ENCOUNTER — HOSPITAL ENCOUNTER (OUTPATIENT)
Dept: CT IMAGING | Facility: CLINIC | Age: 54
Discharge: HOME | End: 2024-04-02
Attending: NURSE PRACTITIONER | Admitting: NURSE PRACTITIONER
Payer: COMMERCIAL

## 2024-04-02 ENCOUNTER — OFFICE VISIT (OUTPATIENT)
Dept: PEDIATRICS | Facility: CLINIC | Age: 54
End: 2024-04-02
Payer: COMMERCIAL

## 2024-04-02 VITALS
TEMPERATURE: 98.7 F | HEART RATE: 70 BPM | RESPIRATION RATE: 14 BRPM | HEIGHT: 62 IN | WEIGHT: 134 LBS | OXYGEN SATURATION: 97 % | DIASTOLIC BLOOD PRESSURE: 65 MMHG | BODY MASS INDEX: 24.66 KG/M2 | SYSTOLIC BLOOD PRESSURE: 97 MMHG

## 2024-04-02 DIAGNOSIS — R10.11 RUQ ABDOMINAL PAIN: Primary | ICD-10-CM

## 2024-04-02 DIAGNOSIS — K43.9 SUPRAUMBILICAL HERNIA: ICD-10-CM

## 2024-04-02 DIAGNOSIS — L03.316 NAVEL CELLULITIS: ICD-10-CM

## 2024-04-02 DIAGNOSIS — M79.18 GLUTEAL PAIN: ICD-10-CM

## 2024-04-02 DIAGNOSIS — R10.33 PERIUMBILICAL PAIN: ICD-10-CM

## 2024-04-02 DIAGNOSIS — M43.06 SPONDYLOLYSIS OF LUMBAR REGION: ICD-10-CM

## 2024-04-02 DIAGNOSIS — R10.11 RUQ ABDOMINAL PAIN: ICD-10-CM

## 2024-04-02 LAB
ALBUMIN SERPL BCG-MCNC: 4.2 G/DL (ref 3.5–5.2)
ALP SERPL-CCNC: 89 U/L (ref 40–150)
ALT SERPL W P-5'-P-CCNC: 16 U/L (ref 0–50)
ANION GAP SERPL CALCULATED.3IONS-SCNC: 10 MMOL/L (ref 7–15)
AST SERPL W P-5'-P-CCNC: 34 U/L (ref 0–45)
BASOPHILS # BLD AUTO: 0 10E3/UL (ref 0–0.2)
BASOPHILS NFR BLD AUTO: 0 %
BILIRUB SERPL-MCNC: 0.7 MG/DL
BUN SERPL-MCNC: 11.9 MG/DL (ref 6–20)
CALCIUM SERPL-MCNC: 9.9 MG/DL (ref 8.6–10)
CHLORIDE SERPL-SCNC: 107 MMOL/L (ref 98–107)
CREAT SERPL-MCNC: 0.72 MG/DL (ref 0.51–0.95)
DEPRECATED HCO3 PLAS-SCNC: 23 MMOL/L (ref 22–29)
EGFRCR SERPLBLD CKD-EPI 2021: >90 ML/MIN/1.73M2
EOSINOPHIL # BLD AUTO: 0 10E3/UL (ref 0–0.7)
EOSINOPHIL NFR BLD AUTO: 1 %
ERYTHROCYTE [DISTWIDTH] IN BLOOD BY AUTOMATED COUNT: 13.3 % (ref 10–15)
GLUCOSE SERPL-MCNC: 83 MG/DL (ref 70–99)
HCT VFR BLD AUTO: 41.6 % (ref 35–47)
HGB BLD-MCNC: 13 G/DL (ref 11.7–15.7)
IMM GRANULOCYTES # BLD: 0 10E3/UL
IMM GRANULOCYTES NFR BLD: 0 %
LIPASE SERPL-CCNC: 32 U/L (ref 13–60)
LYMPHOCYTES # BLD AUTO: 2.3 10E3/UL (ref 0.8–5.3)
LYMPHOCYTES NFR BLD AUTO: 40 %
MCH RBC QN AUTO: 27.8 PG (ref 26.5–33)
MCHC RBC AUTO-ENTMCNC: 31.3 G/DL (ref 31.5–36.5)
MCV RBC AUTO: 89 FL (ref 78–100)
MONOCYTES # BLD AUTO: 0.4 10E3/UL (ref 0–1.3)
MONOCYTES NFR BLD AUTO: 6 %
NEUTROPHILS # BLD AUTO: 3 10E3/UL (ref 1.6–8.3)
NEUTROPHILS NFR BLD AUTO: 53 %
PLATELET # BLD AUTO: 258 10E3/UL (ref 150–450)
POTASSIUM SERPL-SCNC: 4.3 MMOL/L (ref 3.4–5.3)
PROT SERPL-MCNC: 7.2 G/DL (ref 6.4–8.3)
RBC # BLD AUTO: 4.67 10E6/UL (ref 3.8–5.2)
SODIUM SERPL-SCNC: 140 MMOL/L (ref 135–145)
WBC # BLD AUTO: 5.8 10E3/UL (ref 4–11)

## 2024-04-02 PROCEDURE — 85025 COMPLETE CBC W/AUTO DIFF WBC: CPT | Performed by: NURSE PRACTITIONER

## 2024-04-02 PROCEDURE — 83690 ASSAY OF LIPASE: CPT | Performed by: NURSE PRACTITIONER

## 2024-04-02 PROCEDURE — 99215 OFFICE O/P EST HI 40 MIN: CPT | Performed by: NURSE PRACTITIONER

## 2024-04-02 PROCEDURE — 250N000009 HC RX 250: Performed by: NURSE PRACTITIONER

## 2024-04-02 PROCEDURE — 36415 COLL VENOUS BLD VENIPUNCTURE: CPT | Performed by: NURSE PRACTITIONER

## 2024-04-02 PROCEDURE — 250N000011 HC RX IP 250 OP 636: Performed by: NURSE PRACTITIONER

## 2024-04-02 PROCEDURE — 80053 COMPREHEN METABOLIC PANEL: CPT | Performed by: NURSE PRACTITIONER

## 2024-04-02 PROCEDURE — 74177 CT ABD & PELVIS W/CONTRAST: CPT

## 2024-04-02 RX ORDER — IOPAMIDOL 755 MG/ML
65 INJECTION, SOLUTION INTRAVASCULAR ONCE
Status: COMPLETED | OUTPATIENT
Start: 2024-04-02 | End: 2024-04-02

## 2024-04-02 RX ORDER — MUPIROCIN 20 MG/G
OINTMENT TOPICAL 3 TIMES DAILY
Qty: 30 G | Refills: 0 | Status: SHIPPED | OUTPATIENT
Start: 2024-04-02 | End: 2024-08-22

## 2024-04-02 RX ADMIN — SODIUM CHLORIDE 56 ML: 9 INJECTION, SOLUTION INTRAVENOUS at 09:52

## 2024-04-02 RX ADMIN — IOPAMIDOL 65 ML: 755 INJECTION, SOLUTION INTRAVENOUS at 09:51

## 2024-04-02 ASSESSMENT — PAIN SCALES - GENERAL: PAINLEVEL: MILD PAIN (2)

## 2024-04-02 NOTE — PATIENT INSTRUCTIONS
CT of the abdomen does not reveal any obvious cause of your symptoms.  Tiny hernias above the navel and to the right groin, unlikely to be related to current symptoms.  You do have some findings in your spine as discussed.  Use the topical antibiotics to your navel 3 times daily for 5-7 days.  If not improving, follow up with your PCP

## 2024-04-02 NOTE — PROGRESS NOTES
"  Assessment & Plan     RUQ abdominal pain  Occurring for about 2 years with forward flexion of spine, with increasing frequency over the past week. Describes as sudden, severe stabbing type pain lasting seconds to minutes. Can be a shock like type pain. Now experiencing back pain as well. No red flags. Ddx includes biliary colic, pancreatitis, nephrolithiasis, MSK type pain, others. Labs today are unremarkable. CT without acute abdominal findings to explain pain. Given her description, I wonder if this is referred back pain. Does have some lumbar spine findings on CT today as well as right lower back discomfort. Reassurance provided, we will address back pain with PT, see below. If not improving as expected, she will follow up with her PCP for further evaluation, consideration of spinal imaging.  - CBC with platelets differential; Future  - Comprehensive metabolic panel; Future  - Lipase; Future  - CT Abdomen Pelvis w Contrast; Future  - sodium chloride (PF) 0.9% PF flush 3 mL    Supraumbilical hernia  Tiny. Unlikely to be related to pain today.   - CBC with platelets differential; Future  - Comprehensive metabolic panel; Future  - Lipase; Future  - CT Abdomen Pelvis w Contrast; Future  - sodium chloride (PF) 0.9% PF flush 3 mL    Navel cellulitis  Very mild. Will treat with topical mupirocin.   - mupirocin (BACTROBAN) 2 % external ointment; Apply topically 3 times daily    Spondylolysis of lumbar region  Noted on CT of the abdomen today. Has had ongoing back pain, no red flag symptoms. Would recommend PT evaluation. If not improving, follow up with PCP.  - Physical Therapy  Referral; Future    Gluteal pain  This has been present for quite some time, has been told she has \"piriformis syndrome\". Would like to address in PT as well.   - Physical Therapy  Referral; Future    41 minutes spent by me on the date of the encounter doing chart review, review of outside records, review of test results, " interpretation of tests, patient visit, and documentation         Patient Instructions   CT of the abdomen does not reveal any obvious cause of your symptoms.  Tiny hernias above the navel and to the right groin, unlikely to be related to current symptoms.  You do have some findings in your spine as discussed.  Use the topical antibiotics to your navel 3 times daily for 5-7 days.  If not improving, follow up with your PCP    Return in about 1 week (around 4/9/2024) for worsening or continued symptoms.    Liliya Brothers is a 53 year old, presenting for the following health issues:  Abdominal Pain (RUQ/)    HPI     Abdominal/Flank Pain  Onset/Duration: RUQ pain.  Has had Sx on and off for 2-3 years randomly.  Pain is coming on more often the past month, worsening last week.  Belly button is bright pink and sore  Description:   Character: Sharp and Cramping  Location: right upper quadrant  Radiation: RLQ, and belly button is sore   Intensity: mild, Moderate on Monday, severe over the weekend  Progression of Symptoms:  improving and intermittent  Accompanying Signs & Symptoms:  Fever/chills: no   Gas/Bloating: no   Nausea: YES  Vomitting: no   Diarrhea: no   Constipation:no   Dysuria: no            Hematuria: no            Frequency: no            Incontinence of urine: no   History:            Last bowel movement: yesterday  Trauma: no   Previous similar pain: YES   Previous tests done: none           Previous Abdominal surgery: YES, hernia repair  Precipitating factors:   Does the pain change with:     Food: no      Bowel Movement: no     Urination: no              Other factors: YES- bending over, twisting at waist  Therapies tried and outcome:  None    When food last eaten: 4/1/24 7:30 PM    Above HPI reviewed. Additionally, notes that intermittently for the past 2 to 3 years, will have a sudden onset of a sharp, stabbing pain to the right upper quadrant, epigastric area.  This lasts seconds to minutes before  "spontaneously resolving.  This generally only occurs with bending over. Happening with increased frequency over the past week. She cannot identify if high-fat foods bring this pain on as she follows a low-fat diet.  Did have some significant nausea yesterday, no vomiting.  Denies increased urinary frequency, dysuria, hematuria.  No diarrhea or constipation.  No fevers.  Of note, over the past few days, her umbilical area has become reddened and tender.  She does think there has been some mild discharge from the area.  Denies the use of new products with the exception of a shampoo that she has been using for approximately 1 month.  No chest pain, shortness of breath, exertional dyspnea.  Previous surgical history includes a hysterectomy as well as inguinal hernia repair. No ETOH use.    Does also note she has been having some left gluteal and right low back pain. No paresthesias. No reported loss of bowel or bladder control. No LE weakness. No hx of malignancy or recent procedure. No history of IVDU.       Review of Systems  Constitutional, HEENT, cardiovascular, pulmonary, gi and gu systems are negative, except as otherwise noted.      Objective    BP 97/65 (BP Location: Left arm, Patient Position: Sitting, Cuff Size: Adult Regular)   Pulse 70   Temp 98.7  F (37.1  C) (Oral)   Resp 14   Ht 1.575 m (5' 2\")   Wt 60.8 kg (134 lb)   SpO2 97%   BMI 24.51 kg/m    Body mass index is 24.51 kg/m .  Physical Exam  Vitals and nursing note reviewed.   Constitutional:       General: She is not in acute distress.     Appearance: Normal appearance.   HENT:      Head: Normocephalic and atraumatic.      Mouth/Throat:      Mouth: Mucous membranes are moist.   Cardiovascular:      Rate and Rhythm: Normal rate.   Pulmonary:      Effort: Pulmonary effort is normal.   Abdominal:      General: There is no distension.      Palpations: Abdomen is soft.      Tenderness: There is abdominal tenderness (RUQ, periumbilical). There is no " right CVA tenderness, left CVA tenderness, guarding or rebound.      Comments: Umbilicus erythematous, scaly   Musculoskeletal:      Cervical back: Neck supple.      Comments: Mild TTP of right paralumbar musculature. CMS normal to bilateral lower ext   Skin:     General: Skin is warm and dry.   Neurological:      General: No focal deficit present.      Mental Status: She is alert.   Psychiatric:         Mood and Affect: Mood normal.         Behavior: Behavior normal.            Results for orders placed or performed in visit on 04/02/24 (from the past 24 hour(s))   CBC with platelets differential    Narrative    The following orders were created for panel order CBC with platelets differential.  Procedure                               Abnormality         Status                     ---------                               -----------         ------                     CBC with platelets and d...[[id number]]  Abnormal            Final result                 Please view results for these tests on the individual orders.   Comprehensive metabolic panel   Result Value Ref Range    Sodium 140 135 - 145 mmol/L    Potassium 4.3 3.4 - 5.3 mmol/L    Carbon Dioxide (CO2) 23 22 - 29 mmol/L    Anion Gap 10 7 - 15 mmol/L    Urea Nitrogen 11.9 6.0 - 20.0 mg/dL    Creatinine 0.72 0.51 - 0.95 mg/dL    GFR Estimate >90 >60 mL/min/1.73m2    Calcium 9.9 8.6 - 10.0 mg/dL    Chloride 107 98 - 107 mmol/L    Glucose 83 70 - 99 mg/dL    Alkaline Phosphatase 89 40 - 150 U/L    AST 34 0 - 45 U/L    ALT 16 0 - 50 U/L    Protein Total 7.2 6.4 - 8.3 g/dL    Albumin 4.2 3.5 - 5.2 g/dL    Bilirubin Total 0.7 <=1.2 mg/dL   Lipase   Result Value Ref Range    Lipase 32 13 - 60 U/L   CBC with platelets and differential   Result Value Ref Range    WBC Count 5.8 4.0 - 11.0 10e3/uL    RBC Count 4.67 3.80 - 5.20 10e6/uL    Hemoglobin 13.0 11.7 - 15.7 g/dL    Hematocrit 41.6 35.0 - 47.0 %    MCV 89 78 - 100 fL    MCH 27.8 26.5 - 33.0 pg    MCHC 31.3 (L) 31.5  - 36.5 g/dL    RDW 13.3 10.0 - 15.0 %    Platelet Count 258 150 - 450 10e3/uL    % Neutrophils 53 %    % Lymphocytes 40 %    % Monocytes 6 %    % Eosinophils 1 %    % Basophils 0 %    % Immature Granulocytes 0 %    Absolute Neutrophils 3.0 1.6 - 8.3 10e3/uL    Absolute Lymphocytes 2.3 0.8 - 5.3 10e3/uL    Absolute Monocytes 0.4 0.0 - 1.3 10e3/uL    Absolute Eosinophils 0.0 0.0 - 0.7 10e3/uL    Absolute Basophils 0.0 0.0 - 0.2 10e3/uL    Absolute Immature Granulocytes 0.0 <=0.4 10e3/uL     No results found for this visit on 04/02/24.  CT Abdomen Pelvis w Contrast    Result Date: 4/2/2024  CT ABDOMEN/PELVIS WITH CONTRAST April 2, 2024 10:06 AM CLINICAL HISTORY: Progressively worsening right upper quadrant and periumbilical pain. Right upper quadrant abdominal pain. TECHNIQUE: CT scan of the abdomen and pelvis was performed following injection of IV contrast. Multiplanar reformats were obtained. Dose reduction techniques were used. CONTRAST: 65 mL Isovue 370. COMPARISON: 1/4/2018. FINDINGS: LOWER CHEST: Normal. HEPATOBILIARY: Normal. PANCREAS: Normal. SPLEEN: Normal. ADRENAL GLANDS: Normal. KIDNEYS/BLADDER: There is mild circumferential urinary bladder wall thickening, which is most likely secondary to underdistention. No gas in the urinary bladder wall or lumen. No mucosal hyperenhancement. Kidneys enhance modestly and symmetrically. No cystic or solid mass. No nephrolithiasis. BOWEL: No signs of bowel obstruction or inflammation. Moderate volume of formed stool throughout the colon. PELVIC ORGANS: Uterus is absent. No free pelvic fluid or pelvic mass. ADDITIONAL FINDINGS: Tiny fat-containing noninflamed supraumbilical hernia. Tiny fat-containing noninflamed right inguinal hernia. MUSCULOSKELETAL: Multilevel hypertrophic and degenerative changes of the spine. Grade 2 spondylolisthesis of L4 and L5 secondary to bilateral L4 spondylolysis. Mild scoliotic curvature of the spine. No acute osseous abnormality.      IMPRESSION: 1.  No acute intra-abdominal or intrapelvic process. 2.  Nonacute findings as above. CYNTHIA HERNANDEZ MD   SYSTEM ID:  [id number]         Signed Electronically by: GIOVANI Esposito CNP

## 2024-04-19 ENCOUNTER — THERAPY VISIT (OUTPATIENT)
Dept: PHYSICAL THERAPY | Facility: CLINIC | Age: 54
End: 2024-04-19
Attending: NURSE PRACTITIONER
Payer: COMMERCIAL

## 2024-04-19 DIAGNOSIS — M79.18 GLUTEAL PAIN: ICD-10-CM

## 2024-04-19 DIAGNOSIS — M43.06 SPONDYLOLYSIS OF LUMBAR REGION: ICD-10-CM

## 2024-04-19 PROCEDURE — 97161 PT EVAL LOW COMPLEX 20 MIN: CPT | Mod: GP | Performed by: PHYSICAL THERAPIST

## 2024-04-19 PROCEDURE — 97110 THERAPEUTIC EXERCISES: CPT | Mod: GP | Performed by: PHYSICAL THERAPIST

## 2024-04-19 PROCEDURE — 97140 MANUAL THERAPY 1/> REGIONS: CPT | Mod: GP | Performed by: PHYSICAL THERAPIST

## 2024-04-19 NOTE — PROGRESS NOTES
PHYSICAL THERAPY EVALUATION  Type of Visit: Evaluation    See electronic medical record for Abuse and Falls Screening details.    Subjective Was having abdominal pain, thought was similar to hernia pain she had prior. L butt pain, deep on occasion. Denies LBP. Butt pain sitting too long, car rides. Abdominal pain bending over to  something, tie shoes, exruciating R abdominal - this only happened couple times. NOt really having any pain now      Presenting condition or subjective complaint: Discovered that my abdominal pain may be caused by an old injury to my lower back, L4??  Injured my back (heavy lifting) as a teen.  Childbirth (2000, tailbone).  Stomach sleeper most times.  Date of onset:      Relevant medical history: Overweight; Smoking   Dates & types of surgery: hysterectomy (date?); hernia (date?); LEEP procedure (date?)    Prior diagnostic imaging/testing results: Other CT scan for my abdominal issues, not for my lower back.  I've seen a chiropractor many times over the years.   Prior therapy history for the same diagnosis, illness or injury: No      Prior Level of Function  Transfers: Independent  Ambulation: Independent  ADL: Independent  IADL:     Living Environment  Social support: With family members   Type of home: House   Stairs to enter the home: No       Ramp: No   Stairs inside the home: Yes 1 Is there a railing: Yes   Help at home: None  Equipment owned:       Employment: Yes Office work  Hobbies/Interests: walking, francis    Patient goals for therapy: Travel without pain.  Not worry about tying my shoes or stretching triggering intense pains to my abdominal area...thought I had a hernia or ulcer (?) at my last visit.  Jog or walk long distances.    Pain assessment:      Objective   LUMBAR SPINE EVALUATION  PAIN: Pain Quality: Dull, Sharp, and Stabbing  INTEGUMENTARY (edema, incisions):   POSTURE: L lumbar curve - not as noticeable as CT scan appeared, step off at L4  GAIT:   Weightbearing  Status:   Assistive Device(s):   Gait Deviations:   BALANCE/PROPRIOCEPTION:   WEIGHTBEARING ALIGNMENT:   NON-WEIGHTBEARING ALIGNMENT:    ROM: LROM WNL no sx, HIP ROM : WNL B  PELVIC/SI SCREEN: fwd bend neg, supine L leg longer to equal, R ASIS inferior  STRENGTH: hip abd 5, hip ext 4 L, 4+ R, no abdominal sx with resisted abdominal strength rectus or obliques    MYOTOMES:   DTR S:   CORD SIGNS:   DERMATOMES:   NEURAL TENSION: SLR neg  FLEXIBILITY: normal, L butt tension with piriformis stretching  LUMBAR/HIP Special Tests:    PELVIS/SI SPECIAL TESTS:   FUNCTIONAL TESTS:   PALPATION: tender L sacrotuberous ligament, piriformis, L sacral edge, no tenderness in R abdomen  SPINAL SEGMENTAL CONCLUSIONS: no sx with PA on lower ribs      Assessment & Plan   CLINICAL IMPRESSIONS  Medical Diagnosis: spondyloysis LB    Treatment Diagnosis: SI dysfunctionb, abdominal pain of unknown origin   Impression/Assessment: Patient is a 53 year old female with L buttock, abdominal pain complaints.  The following significant findings have been identified: Pain, Decreased joint mobility, and Decreased strength. These impairments interfere with their ability to perform work tasks, recreational activities, and driving  as compared to previous level of function.     Clinical Decision Making (Complexity):  Clinical Presentation: Evolving/Changing  Clinical Presentation Rationale: based on medical and personal factors listed in PT evaluation  Clinical Decision Making (Complexity): Moderate complexity    PLAN OF CARE  Treatment Interventions:  Interventions: Manual Therapy, Neuromuscular Re-education, Therapeutic Activity, Therapeutic Exercise, Self-Care/Home Management    Long Term Goals     PT Goal 1  Goal Identifier: 1  Goal Description: pt will have no limitations in bending in 6wk  Target Date: 05/31/24  PT Goal 2  Goal Identifier: 2  Goal Description: will be able to sit 60-90 min w/o butt pain  Target Date: 05/31/24      Frequency of  Treatment: 1x/wk  Duration of Treatment: 6wkmay follow up with women's health specialist    Recommended Referrals to Other Professionals:   Education Assessment:   Learner/Method: Patient;Pictures/Video;No Barriers to Learning    Risks and benefits of evaluation/treatment have been explained.   Patient/Family/caregiver agrees with Plan of Care.     Evaluation Time:     PT Eval, Low Complexity Minutes (11822): 20       Signing Clinician: Kris Hoenk, PT

## 2024-05-04 ENCOUNTER — HEALTH MAINTENANCE LETTER (OUTPATIENT)
Age: 54
End: 2024-05-04

## 2024-05-24 ENCOUNTER — TELEPHONE (OUTPATIENT)
Dept: FAMILY MEDICINE | Facility: CLINIC | Age: 54
End: 2024-05-24
Payer: COMMERCIAL

## 2024-05-24 NOTE — LETTER
May 24, 2024    To  Deneen Hyde  [address] [place] [id number]    Your team at [facility] cares about your health. We have reviewed your chart and based on our findings; we are making the following recommendations to better manage your health.     You are in particular need of attention regarding the following:     Schedule Annual MAMMOGRAPHY. [facility] scheduling number is [phone] or schedule in [facility] (self referral).    If you have already completed these items, please contact the clinic via phone or   [facility] so your care team can review and update your records. Thank you for   choosing [facility] for your healthcare needs. For any questions,   concerns, or to schedule an appointment please contact our clinic.    Healthy Regards,      Your [facility] Care Team

## 2024-05-24 NOTE — TELEPHONE ENCOUNTER
Patient Quality Outreach    Patient is due for the following:   Breast Cancer Screening - Mammogram    Next Steps:   Pt to schedule    Type of outreach:    Sent letter.      Questions for provider review:    None           Darren He

## 2024-06-17 ENCOUNTER — TELEPHONE (OUTPATIENT)
Dept: FAMILY MEDICINE | Facility: CLINIC | Age: 54
End: 2024-06-17
Payer: COMMERCIAL

## 2024-06-17 NOTE — TELEPHONE ENCOUNTER
AFR asked writer to call patient to triage due to scheduled appointment on 8/22 with Olena Fernandes. Appointment note indicates sharp pain in upper right abdomin.     Attempted to contact patient. Non detailed message left to return call to the clinic and ask to speak to a nurse.    If patient does not need a same day appointment or to be seen prior to 8/22. Her appointment still needs to be changed as provider will not be in early that day at patient's appointment time. She will need to be rescheduled later in the day.    Thank you,  Martha Vogt RN

## 2024-06-18 NOTE — TELEPHONE ENCOUNTER
2nd call attempt.    Left message on answering machine for patient to call back.     Lynda HERNÁNDEZ RN  [facility]  [phone]

## 2024-06-19 NOTE — TELEPHONE ENCOUNTER
3rd attempt.  Patient was instructed to return call to [facility] main line at [phone] to speak with an RN.    Kasie Swanson RN  [facility]

## 2024-07-16 ENCOUNTER — PATIENT OUTREACH (OUTPATIENT)
Dept: CARE COORDINATION | Facility: CLINIC | Age: 54
End: 2024-07-16
Payer: COMMERCIAL

## 2024-07-30 ENCOUNTER — PATIENT OUTREACH (OUTPATIENT)
Dept: CARE COORDINATION | Facility: CLINIC | Age: 54
End: 2024-07-30
Payer: COMMERCIAL

## 2024-08-02 ENCOUNTER — PATIENT OUTREACH (OUTPATIENT)
Dept: CARE COORDINATION | Facility: CLINIC | Age: 54
End: 2024-08-02
Payer: COMMERCIAL

## 2024-08-22 ENCOUNTER — OFFICE VISIT (OUTPATIENT)
Dept: FAMILY MEDICINE | Facility: CLINIC | Age: 54
End: 2024-08-22
Payer: COMMERCIAL

## 2024-08-22 VITALS
TEMPERATURE: 97 F | OXYGEN SATURATION: 97 % | SYSTOLIC BLOOD PRESSURE: 110 MMHG | DIASTOLIC BLOOD PRESSURE: 62 MMHG | BODY MASS INDEX: 23.81 KG/M2 | WEIGHT: 129.4 LBS | RESPIRATION RATE: 14 BRPM | HEIGHT: 62 IN | HEART RATE: 68 BPM

## 2024-08-22 DIAGNOSIS — Z12.31 VISIT FOR SCREENING MAMMOGRAM: ICD-10-CM

## 2024-08-22 DIAGNOSIS — Z00.00 ROUTINE GENERAL MEDICAL EXAMINATION AT A HEALTH CARE FACILITY: Primary | ICD-10-CM

## 2024-08-22 PROCEDURE — 99396 PREV VISIT EST AGE 40-64: CPT | Performed by: NURSE PRACTITIONER

## 2024-08-22 RX ORDER — CETIRIZINE HYDROCHLORIDE 10 MG/1
10 TABLET ORAL DAILY PRN
COMMUNITY

## 2024-08-22 SDOH — HEALTH STABILITY: PHYSICAL HEALTH: ON AVERAGE, HOW MANY DAYS PER WEEK DO YOU ENGAGE IN MODERATE TO STRENUOUS EXERCISE (LIKE A BRISK WALK)?: 5 DAYS

## 2024-08-22 ASSESSMENT — SOCIAL DETERMINANTS OF HEALTH (SDOH): HOW OFTEN DO YOU GET TOGETHER WITH FRIENDS OR RELATIVES?: THREE TIMES A WEEK

## 2024-08-22 ASSESSMENT — PAIN SCALES - GENERAL: PAINLEVEL: NO PAIN (0)

## 2024-08-22 NOTE — PATIENT INSTRUCTIONS
Patient Education   Preventive Care Advice   This is general advice given by our system to help you stay healthy. However, your care team may have specific advice just for you. Please talk to your care team about your preventive care needs.  Nutrition  Eat 5 or more servings of fruits and vegetables each day.  Try wheat bread, brown rice and whole grain pasta (instead of white bread, rice, and pasta).  Get enough calcium and vitamin D. Check the label on foods and aim for 100% of the RDA (recommended daily allowance).  Lifestyle  Exercise at least 150 minutes each week  (30 minutes a day, 5 days a week).  Do muscle strengthening activities 2 days a week. These help control your weight and prevent disease.  No smoking.  Wear sunscreen to prevent skin cancer.  Have a dental exam and cleaning every 6 months.  Yearly exams  See your health care team every year to talk about:  Any changes in your health.  Any medicines your care team has prescribed.  Preventive care, family planning, and ways to prevent chronic diseases.  Shots (vaccines)   HPV shots (up to age 26), if you've never had them before.  Hepatitis B shots (up to age 59), if you've never had them before.  COVID-19 shot: Get this shot when it's due.  Flu shot: Get a flu shot every year.  Tetanus shot: Get a tetanus shot every 10 years.  Pneumococcal, hepatitis A, and RSV shots: Ask your care team if you need these based on your risk.  Shingles shot (for age 50 and up)  General health tests  Diabetes screening:  Starting at age 35, Get screened for diabetes at least every 3 years.  If you are younger than age 35, ask your care team if you should be screened for diabetes.  Cholesterol test: At age 39, start having a cholesterol test every 5 years, or more often if advised.  Bone density scan (DEXA): At age 50, ask your care team if you should have this scan for osteoporosis (brittle bones).  Hepatitis C: Get tested at least once in your life.  STIs (sexually  transmitted infections)  Before age 24: Ask your care team if you should be screened for STIs.  After age 24: Get screened for STIs if you're at risk. You are at risk for STIs (including HIV) if:  You are sexually active with more than one person.  You don't use condoms every time.  You or a partner was diagnosed with a sexually transmitted infection.  If you are at risk for HIV, ask about PrEP medicine to prevent HIV.  Get tested for HIV at least once in your life, whether you are at risk for HIV or not.  Cancer screening tests  Cervical cancer screening: If you have a cervix, begin getting regular cervical cancer screening tests starting at age 21.  Breast cancer scan (mammogram): If you've ever had breasts, begin having regular mammograms starting at age 40. This is a scan to check for breast cancer.  Colon cancer screening: It is important to start screening for colon cancer at age 45.  Have a colonoscopy test every 10 years (or more often if you're at risk) Or, ask your provider about stool tests like a FIT test every year or Cologuard test every 3 years.  To learn more about your testing options, visit:   .  For help making a decision, visit:   https://bit.ly/lq03348.  Prostate cancer screening test: If you have a prostate, ask your care team if a prostate cancer screening test (PSA) at age 55 is right for you.  Lung cancer screening: If you are a current or former smoker ages 50 to 80, ask your care team if ongoing lung cancer screenings are right for you.  For informational purposes only. Not to replace the advice of your health care provider. Copyright   2023 [employer]. All rights reserved. Clinically reviewed by the [facility] Transitions Program. [employer] [id number] - REV 01/24.

## 2024-08-22 NOTE — PROGRESS NOTES
Preventive Care Visit  [facility]  Olena Fernandes NP, Family Medicine  Aug 22, 2024    Assessment & Plan     Routine general medical examination at a health care facility  Patient is up-to-date on all screenings except for mammogram.  Order placed to have this completed.  Reviewed preventive health recommendations and advised follow-up in 1 year for preventative exam  - REVIEW OF HEALTH MAINTENANCE PROTOCOL ORDERS    Visit for screening mammogram  - MA Screening Bilateral w/ Amador; Future    See Patient Instructions    Subjective   Deneen is a 53 year old, presenting for the following:  Physical        8/22/2024     8:26 AM   Additional Questions   Roomed by rmb   Accompanied by self         8/22/2024     8:26 AM   Patient Reported Additional Medications   Patient reports taking the following new medications none        Via the Health Maintenance questionnaire, the patient has reported the following services have been completed -Mammogram: [facility] 2023-04-16, this information has not been sent to the abstraction team.  Health Care Directive  Patient does not have a Health Care Directive or Living Will: Discussed advance care planning with patient; information given to patient to review.    HPI        8/22/2024   General Health   How would you rate your overall physical health? Excellent   Feel stress (tense, anxious, or unable to sleep) To some extent      (!) STRESS CONCERN      8/22/2024   Nutrition   Three or more servings of calcium each day? Yes   Diet: Diabetic   How many servings of fruit and vegetables per day? 4 or more   How many sweetened beverages each day? 0-1            8/22/2024   Exercise   Days per week of moderate/strenous exercise 5 days            8/22/2024   Social Factors   Frequency of gathering with friends or relatives Three times a week   Worry food won't last until get money to buy more No   Food not last or not have enough money for food? No   Do  you have housing? (Housing is defined as stable permanent housing and does not include staying ouside in a car, in a tent, in an abandoned building, in an overnight shelter, or couch-surfing.) Yes   Are you worried about losing your housing? No   Lack of transportation? No   Unable to get utilities (heat,electricity)? No            2024   Fall Risk   Fallen 2 or more times in the past year? No   Trouble with walking or balance? No             2024   Dental   Dentist two times every year? Yes            2024   TB Screening   Were you born outside of the [place]? No            Today's PHQ-2 Score:       2024     8:15 AM   PHQ-2 (  [employer])   Q1: Little interest or pleasure in doing things 0   Q2: Feeling down, depressed or hopeless 0   PHQ-2 Score 0   Q1: Little interest or pleasure in doing things Not at all   Q2: Feeling down, depressed or hopeless Not at all   PHQ-2 Score 0           2024   Substance Use   Alcohol more than 3/day or more than 7/wk No   Do you use any other substances recreationally? No        Social History     Tobacco Use    Smoking status: Former     Current packs/day: 0.00     Types: Cigarettes     Quit date: 1998     Years since quittin.8    Smokeless tobacco: Never   Vaping Use    Vaping status: Never Used   Substance Use Topics    Alcohol use: No    Drug use: No         2022   LAST FHS-7 RESULTS   1st degree relative breast or ovarian cancer No   Any relative bilateral breast cancer No   Any male have breast cancer No   Any ONE woman have BOTH breast AND ovarian cancer No   Any woman with breast cancer before 50yrs No   2 or more relatives with breast AND/OR ovarian cancer No   2 or more relatives with breast AND/OR bowel cancer No      Mammogram Screening - Mammogram every 1-2 years updated in Health Maintenance based on mutual decision making        2024   STI Screening   New sexual partner(s) since last STI/HIV test? No        History of abnormal  Pap smear: No - age 30- 64 PAP with HPV every 5 years recommended        2015    12:00 AM   PAP / HPV   PAP-ABSTRACT See Scanned Document           This result is from an external source.     ASCVD Risk   The 10-year ASCVD risk score (Concepcion WILSON, et al., 2019) is: 0.6%    Values used to calculate the score:      Age: 53 years      Sex: Female      Is Non- : No      Diabetic: No      Tobacco smoker: No      Systolic Blood Pressure: 110 mmHg      Is BP treated: No      HDL Cholesterol: 78 mg/dL      Total Cholesterol: 151 mg/dL    Fracture Risk Assessment Tool  Link to Frax Calculator  Use the information below to complete the Frax calculator  : 1970  Sex: female  Weight (kg): 58.7 kg (actual weight)  Height (cm): 158 cm  Previous Fragility Fracture:  No  History of parent with fractured hip:  No  Current Smoking:  No  Patient has been on glucocorticoids for more than 3 months (5mg/day or more): No  Rheumatoid Arthritis on Problem List:  No  Secondary Osteoporosis on Problem List:  No  Consumes 3 or more units of alcohol per day: No  Femoral Neck BMD (g/cm2)    Reviewed and updated as needed this visit by Provider   Tobacco  Allergies  Meds  Problems  Med Hx  Surg Hx  Fam Hx  Soc   Hx Sexual Activity          History reviewed. No pertinent past medical history.  Past Surgical History:   Procedure Laterality Date    COLONOSCOPY N/A 2022    Procedure: COLONOSCOPY;  Surgeon: Gadiel Roblero MD;  Location: [facility]    GYN SURGERY      HYSTERECTOMY, PAP NO LONGER INDICATED      2016    LAPAROSCOPIC HERNIORRHAPHY INGUINAL BILATERAL Bilateral 2018    Procedure: LAPAROSCOPIC HERNIORRHAPHY INGUINAL BILATERAL;  Laparoscopic Bilateral Inguinal Hernia Repair ;  Surgeon: Gadiel Roblero MD;  Location: [facility]     Lab work is in process  Labs reviewed in EPIC  BP Readings from Last 3 Encounters:   24 110/62   24 97/65   23 90/65    Wt Readings from  Last 3 Encounters:   24 58.7 kg (129 lb 6.4 oz)   24 60.8 kg (134 lb)   23 64.7 kg (142 lb 9.6 oz)                  Patient Active Problem List   Diagnosis    Abnormal glandular Papanicolaou smear of cervix    Allergic rhinitis    S/P laparoscopic hysterectomy    Spondylolysis of lumbar region     Past Surgical History:   Procedure Laterality Date    COLONOSCOPY N/A 2022    Procedure: COLONOSCOPY;  Surgeon: Gadiel Roblero MD;  Location: [facility]    GYN SURGERY      HYSTERECTOMY, PAP NO LONGER INDICATED      2016    LAPAROSCOPIC HERNIORRHAPHY INGUINAL BILATERAL Bilateral 2018    Procedure: LAPAROSCOPIC HERNIORRHAPHY INGUINAL BILATERAL;  Laparoscopic Bilateral Inguinal Hernia Repair ;  Surgeon: Gadiel Roblero MD;  Location: [facility]       Social History     Tobacco Use    Smoking status: Former     Current packs/day: 0.00     Types: Cigarettes     Quit date: 1998     Years since quittin.8    Smokeless tobacco: Never   Substance Use Topics    Alcohol use: No     Family History   Problem Relation Age of Onset    Thyroid Disease Mother     Asthma Mother     Suicide Father     Suicide Sister     Thyroid Disease Sister     Depression Daughter          Current Outpatient Medications   Medication Sig Dispense Refill    CALCIUM PO 1 tablet daily when she remembers , unknown dose      cetirizine (ZYRTEC) 10 MG tablet Take 10 mg by mouth daily as needed for allergies.      Cholecalciferol (VITAMIN D3) 50 MCG (2000 UT) CAPS Take by mouth daily      EPINEPHrine (ANY BX GENERIC EQUIV) 0.3 MG/0.3ML injection 2-pack Inject 0.3 mLs (0.3 mg) into the muscle as needed for anaphylaxis 2 each 3    melatonin 1 MG CAPS Take 7.5 mg by mouth      Multiple Vitamins-Minerals (MULTIVITAMIN PO)       Omega-3 Fish Oil 500 MG capsule Take 1 capsule (500 mg) by mouth daily      Probiotic Product (PRO-BIOTIC BLEND PO)       B Complex-C (VITAMIN B COMPLEX W/VITAMIN C) TABS tablet Take 1 tablet by mouth daily  "(Patient not taking: Reported on 8/22/2024)       Allergies   Allergen Reactions    Orange Fruit [Citrus] Anaphylaxis    Tomatoes [Tomato] Anaphylaxis    Sulfa Antibiotics Swelling     PN: eyes swelled    Trimethoprim      PN: eyes swelling / itching     Recent Labs   Lab Test 04/02/24  0929 08/15/23  0944 01/15/20  0815   LDL  --   --  60   HDL  --   --  78   TRIG  --   --  66   ALT 16  --   --    CR 0.72  --  0.67   GFRESTIMATED >90  --  >90   GFRESTBLACK  --   --  >90   POTASSIUM 4.3  --  3.8   TSH  --  2.50 3.57          Review of Systems  CONSTITUTIONAL: NEGATIVE for fever, chills, change in weight  INTEGUMENTARY/SKIN: NEGATIVE for worrisome rashes, moles or lesions  EYES: NEGATIVE for vision changes or irritation  ENT/MOUTH: NEGATIVE for ear, mouth and throat problems  RESP: NEGATIVE for significant cough or SOB  BREAST: NEGATIVE for masses, tenderness or discharge  CV: NEGATIVE for chest pain, palpitations or peripheral edema  GI: NEGATIVE for nausea, abdominal pain, heartburn, or change in bowel habits  : NEGATIVE for frequency, dysuria, or hematuria  MUSCULOSKELETAL:POSITIVE  for left hip muscle tightness intermittently,  massages it and this helps  NEURO: NEGATIVE for weakness, dizziness or paresthesias  ENDOCRINE: NEGATIVE for temperature intolerance, skin/hair changes  HEME: NEGATIVE for bleeding problems  PSYCHIATRIC: NEGATIVE for changes in mood or affect     Objective    Exam  /62   Pulse 68   Temp 97  F (36.1  C) (Tympanic)   Resp 14   Ht 1.58 m (5' 2.21\")   Wt 58.7 kg (129 lb 6.4 oz)   SpO2 97%   BMI 23.51 kg/m     Estimated body mass index is 23.51 kg/m  as calculated from the following:    Height as of this encounter: 1.58 m (5' 2.21\").    Weight as of this encounter: 58.7 kg (129 lb 6.4 oz).    Physical Exam  GENERAL: alert and no distress  EYES: Eyes grossly normal to inspection, PERRL and conjunctivae and sclerae normal  HENT: ear canals and TM's normal, nose and mouth " without ulcers or lesions  NECK: no adenopathy, no asymmetry, masses, or scars  RESP: lungs clear to auscultation - no rales, rhonchi or wheezes  CV: regular rate and rhythm, normal S1 S2, no S3 or S4, no murmur, click or rub, no peripheral edema  ABDOMEN: soft, nontender, no hepatosplenomegaly, no masses and bowel sounds normal  MS: no gross musculoskeletal defects noted, no edema  SKIN: no suspicious lesions or rashes  NEURO: Normal strength and tone, mentation intact and speech normal  PSYCH: mentation appears normal, affect normal/bright  LYMPH: normal ant/post cervical, supraclavicular nodes    Signed Electronically by: Olena Fernandes, NP

## 2024-11-15 ENCOUNTER — TELEPHONE (OUTPATIENT)
Dept: FAMILY MEDICINE | Facility: CLINIC | Age: 54
End: 2024-11-15
Payer: COMMERCIAL

## 2024-11-15 NOTE — LETTER
November 15, 2024    To  Deneen Hyde  [address] [place] [id number]    Your team at [facility] cares about your health. We have reviewed your chart and based on our findings; we are making the following recommendations to better manage your health.     You are in particular need of attention regarding the following:     Schedule Annual MAMMOGRAPHY. [facility] scheduling number is [phone] or schedule in [facility] (self referral).    If you have already completed these items, please contact the clinic via phone or   [facility] so your care team can review and update your records. Thank you for   choosing [facility] for your healthcare needs. For any questions,   concerns, or to schedule an appointment please contact our clinic.    Healthy Regards,      Your [facility] Care Team

## 2024-11-15 NOTE — TELEPHONE ENCOUNTER
Patient Quality Outreach    Patient is due for the following:   Breast Cancer Screening - Mammogram    Action(s) Taken:   Pt to schedule    Type of outreach:    Sent letter.    Questions for provider review:    None           Darren He

## 2024-12-17 ENCOUNTER — PATIENT OUTREACH (OUTPATIENT)
Dept: CARE COORDINATION | Facility: CLINIC | Age: 54
End: 2024-12-17
Payer: COMMERCIAL

## 2025-01-25 ENCOUNTER — LAB (OUTPATIENT)
Dept: LAB | Facility: CLINIC | Age: 55
End: 2025-01-25
Payer: COMMERCIAL

## 2025-01-25 DIAGNOSIS — B96.89 BV (BACTERIAL VAGINOSIS): Primary | ICD-10-CM

## 2025-01-25 DIAGNOSIS — N76.0 BV (BACTERIAL VAGINOSIS): Primary | ICD-10-CM

## 2025-01-25 DIAGNOSIS — R30.0 DYSURIA: ICD-10-CM

## 2025-01-25 LAB
ALBUMIN UR-MCNC: NEGATIVE MG/DL
APPEARANCE UR: CLEAR
BILIRUB UR QL STRIP: NEGATIVE
CLUE CELLS: PRESENT
COLOR UR AUTO: YELLOW
GLUCOSE UR STRIP-MCNC: NEGATIVE MG/DL
HGB UR QL STRIP: NEGATIVE
KETONES UR STRIP-MCNC: NEGATIVE MG/DL
LEUKOCYTE ESTERASE UR QL STRIP: NEGATIVE
NITRATE UR QL: NEGATIVE
PH UR STRIP: 7 [PH] (ref 5–7)
SP GR UR STRIP: 1.01 (ref 1–1.03)
TRICHOMONAS, WET PREP: ABNORMAL
UROBILINOGEN UR STRIP-ACNC: 0.2 E.U./DL
WBC'S/HIGH POWER FIELD, WET PREP: ABNORMAL
YEAST, WET PREP: ABNORMAL

## 2025-01-25 PROCEDURE — 81003 URINALYSIS AUTO W/O SCOPE: CPT

## 2025-01-25 PROCEDURE — 87210 SMEAR WET MOUNT SALINE/INK: CPT

## 2025-01-25 RX ORDER — METRONIDAZOLE 500 MG/1
500 TABLET ORAL 2 TIMES DAILY
Qty: 14 TABLET | Refills: 0 | Status: SHIPPED | OUTPATIENT
Start: 2025-01-25 | End: 2025-02-01

## 2025-01-31 ENCOUNTER — OFFICE VISIT (OUTPATIENT)
Dept: URGENT CARE | Facility: URGENT CARE | Age: 55
End: 2025-01-31
Payer: COMMERCIAL

## 2025-01-31 VITALS
WEIGHT: 128 LBS | BODY MASS INDEX: 23.26 KG/M2 | DIASTOLIC BLOOD PRESSURE: 64 MMHG | SYSTOLIC BLOOD PRESSURE: 95 MMHG | OXYGEN SATURATION: 98 % | HEART RATE: 67 BPM | RESPIRATION RATE: 16 BRPM | TEMPERATURE: 97.9 F

## 2025-01-31 DIAGNOSIS — N94.9 VAGINAL SYMPTOM: ICD-10-CM

## 2025-01-31 DIAGNOSIS — N39.0 ACUTE UTI: Primary | ICD-10-CM

## 2025-01-31 LAB
ALBUMIN UR-MCNC: NEGATIVE MG/DL
APPEARANCE UR: CLEAR
BILIRUB UR QL STRIP: NEGATIVE
CLUE CELLS: ABNORMAL
COLOR UR AUTO: YELLOW
GLUCOSE UR STRIP-MCNC: NEGATIVE MG/DL
HGB UR QL STRIP: ABNORMAL
KETONES UR STRIP-MCNC: NEGATIVE MG/DL
LEUKOCYTE ESTERASE UR QL STRIP: ABNORMAL
MUCOUS THREADS #/AREA URNS LPF: PRESENT /LPF
NITRATE UR QL: NEGATIVE
PH UR STRIP: 6 [PH] (ref 5–7)
RBC #/AREA URNS AUTO: ABNORMAL /HPF
SP GR UR STRIP: 1.02 (ref 1–1.03)
TRICHOMONAS, WET PREP: ABNORMAL
UROBILINOGEN UR STRIP-ACNC: 0.2 E.U./DL
WBC #/AREA URNS AUTO: ABNORMAL /HPF
WBC'S/HIGH POWER FIELD, WET PREP: ABNORMAL
YEAST, WET PREP: ABNORMAL

## 2025-01-31 PROCEDURE — 87086 URINE CULTURE/COLONY COUNT: CPT | Performed by: PHYSICIAN ASSISTANT

## 2025-01-31 PROCEDURE — 81001 URINALYSIS AUTO W/SCOPE: CPT | Performed by: PHYSICIAN ASSISTANT

## 2025-01-31 PROCEDURE — 99204 OFFICE O/P NEW MOD 45 MIN: CPT | Performed by: PHYSICIAN ASSISTANT

## 2025-01-31 PROCEDURE — 87210 SMEAR WET MOUNT SALINE/INK: CPT | Performed by: PHYSICIAN ASSISTANT

## 2025-01-31 RX ORDER — NITROFURANTOIN 25; 75 MG/1; MG/1
100 CAPSULE ORAL 2 TIMES DAILY
Qty: 10 CAPSULE | Refills: 0 | Status: SHIPPED | OUTPATIENT
Start: 2025-01-31 | End: 2025-02-05

## 2025-01-31 NOTE — PROGRESS NOTES
Assessment & Plan     Acute UTI  Culture pending. Will treat with macrobid. Push fluids. Return to clinic if symptoms worsen or do not improve; otherwise follow up as needed     - nitroFURantoin macrocrystal-monohydrate (MACROBID) 100 MG capsule; Take 1 capsule (100 mg) by mouth 2 times daily for 5 days.    Vaginal symptom    - Wet prep - Clinic Collect  - UA Macroscopic with reflex to Microscopic and Culture - Clinic Collect  - Urine Microscopic Exam  - Urine Culture                Return in about 3 days (around 2/3/2025), or if symptoms worsen or fail to improve.          Subjective   Chief Complaint   Patient presents with    Vaginal Problem    Urinary Problem     Urgency, burning, itching and discharge       HPI     UTI    Onset of symptoms was today  Course of illness is same  Severity moderate  Current and associated symptoms dysuria and frequency  Treatment and measures tried metronidazole  Predisposing factors include none  Patient denies fever                        Objective    BP 95/64   Pulse 67   Temp 97.9  F (36.6  C) (Tympanic)   Resp 16   Wt 58.1 kg (128 lb)   SpO2 98%   BMI 23.26 kg/m    Body mass index is 23.26 kg/m .  Physical Exam  Constitutional:       General: She is not in acute distress.     Appearance: She is well-developed.   Psychiatric:         Behavior: Behavior normal.                    Signed Electronically by: Lissa Alatorre PA-C

## 2025-02-02 LAB — BACTERIA UR CULT: NORMAL

## 2025-07-23 ENCOUNTER — PATIENT OUTREACH (OUTPATIENT)
Dept: CARE COORDINATION | Facility: CLINIC | Age: 55
End: 2025-07-23
Payer: COMMERCIAL

## 2025-08-06 ENCOUNTER — PATIENT OUTREACH (OUTPATIENT)
Dept: CARE COORDINATION | Facility: CLINIC | Age: 55
End: 2025-08-06
Payer: COMMERCIAL

## 2025-08-11 ENCOUNTER — E-VISIT (OUTPATIENT)
Dept: URGENT CARE | Facility: CLINIC | Age: 55
End: 2025-08-11
Payer: COMMERCIAL

## 2025-08-11 DIAGNOSIS — R30.0 DIFFICULT OR PAINFUL URINATION: Primary | ICD-10-CM

## 2025-08-12 ENCOUNTER — RESULTS FOLLOW-UP (OUTPATIENT)
Dept: URGENT CARE | Facility: CLINIC | Age: 55
End: 2025-08-12

## 2025-08-12 ENCOUNTER — LAB (OUTPATIENT)
Dept: LAB | Facility: CLINIC | Age: 55
End: 2025-08-12
Payer: COMMERCIAL

## 2025-08-12 DIAGNOSIS — R30.0 DIFFICULT OR PAINFUL URINATION: ICD-10-CM

## 2025-08-12 DIAGNOSIS — B96.89 BV (BACTERIAL VAGINOSIS): Primary | ICD-10-CM

## 2025-08-12 DIAGNOSIS — N76.0 BV (BACTERIAL VAGINOSIS): Primary | ICD-10-CM

## 2025-08-12 DIAGNOSIS — N30.00 ACUTE CYSTITIS WITHOUT HEMATURIA: ICD-10-CM

## 2025-08-12 LAB
ALBUMIN UR-MCNC: NEGATIVE MG/DL
AMORPH CRY #/AREA URNS HPF: ABNORMAL /HPF
APPEARANCE UR: CLEAR
BACTERIA #/AREA URNS HPF: ABNORMAL /HPF
BILIRUB UR QL STRIP: NEGATIVE
CLUE CELLS: PRESENT
COLOR UR AUTO: YELLOW
GLUCOSE UR STRIP-MCNC: NEGATIVE MG/DL
HGB UR QL STRIP: NEGATIVE
KETONES UR STRIP-MCNC: NEGATIVE MG/DL
LEUKOCYTE ESTERASE UR QL STRIP: ABNORMAL
NITRATE UR QL: NEGATIVE
PH UR STRIP: 7 [PH] (ref 5–7)
RBC #/AREA URNS AUTO: ABNORMAL /HPF
SP GR UR STRIP: 1.01 (ref 1–1.03)
SQUAMOUS #/AREA URNS AUTO: ABNORMAL /LPF
TRICHOMONAS, WET PREP: ABNORMAL
UROBILINOGEN UR STRIP-ACNC: 0.2 E.U./DL
WBC #/AREA URNS AUTO: ABNORMAL /HPF
WBC'S/HIGH POWER FIELD, WET PREP: ABNORMAL
YEAST, WET PREP: ABNORMAL

## 2025-08-12 PROCEDURE — 81001 URINALYSIS AUTO W/SCOPE: CPT

## 2025-08-12 PROCEDURE — 87210 SMEAR WET MOUNT SALINE/INK: CPT

## 2025-08-12 RX ORDER — CEPHALEXIN 500 MG/1
500 CAPSULE ORAL 2 TIMES DAILY
Qty: 10 CAPSULE | Refills: 0 | Status: SHIPPED | OUTPATIENT
Start: 2025-08-12 | End: 2025-08-17

## 2025-08-12 RX ORDER — METRONIDAZOLE 500 MG/1
500 TABLET ORAL 2 TIMES DAILY
Qty: 14 TABLET | Refills: 0 | Status: SHIPPED | OUTPATIENT
Start: 2025-08-12 | End: 2025-08-19

## 2025-08-23 ENCOUNTER — OFFICE VISIT (OUTPATIENT)
Dept: URGENT CARE | Facility: URGENT CARE | Age: 55
End: 2025-08-23
Payer: COMMERCIAL

## 2025-08-23 VITALS
OXYGEN SATURATION: 99 % | DIASTOLIC BLOOD PRESSURE: 67 MMHG | BODY MASS INDEX: 26.17 KG/M2 | RESPIRATION RATE: 18 BRPM | WEIGHT: 142.2 LBS | TEMPERATURE: 98.7 F | SYSTOLIC BLOOD PRESSURE: 92 MMHG | HEIGHT: 62 IN | HEART RATE: 67 BPM

## 2025-08-23 DIAGNOSIS — R30.0 DYSURIA: ICD-10-CM

## 2025-08-23 DIAGNOSIS — N89.8 VAGINAL ITCHING: ICD-10-CM

## 2025-08-23 DIAGNOSIS — N30.00 ACUTE CYSTITIS WITHOUT HEMATURIA: Primary | ICD-10-CM

## 2025-08-23 LAB
ALBUMIN UR-MCNC: NEGATIVE MG/DL
APPEARANCE UR: CLEAR
BACTERIA #/AREA URNS HPF: ABNORMAL /HPF
BILIRUB UR QL STRIP: NEGATIVE
CLUE CELLS: ABNORMAL
COLOR UR AUTO: YELLOW
GLUCOSE UR STRIP-MCNC: NEGATIVE MG/DL
HGB UR QL STRIP: NEGATIVE
KETONES UR STRIP-MCNC: NEGATIVE MG/DL
LEUKOCYTE ESTERASE UR QL STRIP: ABNORMAL
NITRATE UR QL: NEGATIVE
PH UR STRIP: 7.5 [PH] (ref 5–7)
RBC #/AREA URNS AUTO: ABNORMAL /HPF
SP GR UR STRIP: 1.01 (ref 1–1.03)
TRICHOMONAS, WET PREP: ABNORMAL
UROBILINOGEN UR STRIP-ACNC: 0.2 E.U./DL
WBC #/AREA URNS AUTO: ABNORMAL /HPF
WBC'S/HIGH POWER FIELD, WET PREP: ABNORMAL
YEAST, WET PREP: ABNORMAL

## 2025-08-23 PROCEDURE — 99214 OFFICE O/P EST MOD 30 MIN: CPT | Performed by: NURSE PRACTITIONER

## 2025-08-23 PROCEDURE — 87086 URINE CULTURE/COLONY COUNT: CPT | Performed by: NURSE PRACTITIONER

## 2025-08-23 PROCEDURE — 87186 SC STD MICRODIL/AGAR DIL: CPT | Performed by: NURSE PRACTITIONER

## 2025-08-23 PROCEDURE — 81001 URINALYSIS AUTO W/SCOPE: CPT | Performed by: NURSE PRACTITIONER

## 2025-08-23 PROCEDURE — 3074F SYST BP LT 130 MM HG: CPT | Performed by: NURSE PRACTITIONER

## 2025-08-23 PROCEDURE — 3078F DIAST BP <80 MM HG: CPT | Performed by: NURSE PRACTITIONER

## 2025-08-23 PROCEDURE — 87210 SMEAR WET MOUNT SALINE/INK: CPT | Performed by: NURSE PRACTITIONER

## 2025-08-23 RX ORDER — NITROFURANTOIN 25; 75 MG/1; MG/1
100 CAPSULE ORAL 2 TIMES DAILY
Qty: 14 CAPSULE | Refills: 0 | Status: SHIPPED | OUTPATIENT
Start: 2025-08-23 | End: 2025-08-30

## 2025-08-24 LAB — BACTERIA UR CULT: ABNORMAL

## (undated) DEVICE — PREP CHLORAPREP 26ML TINTED ORANGE  260815

## (undated) DEVICE — DECANTER VIAL 2006S

## (undated) DEVICE — ADHESIVE SWIFTSET 0.8ML OCTYL SS6

## (undated) DEVICE — Device

## (undated) DEVICE — SOL NACL 0.9% IRRIG 1000ML BOTTLE 07138-09

## (undated) DEVICE — ENDO TROCAR DISSECTING BALLOON OMS-PDBS2

## (undated) DEVICE — ENDO TROCAR OPTICAL 05MM VERSAPORT PLUS W/FIX CAN ONB5STF

## (undated) DEVICE — GOWN XLG DISP 9545

## (undated) DEVICE — ENDO TROCAR BALLOON TIP 10MM OMS-T10BT

## (undated) DEVICE — BLADE CLIPPER 4406

## (undated) DEVICE — GLOVE PROTEXIS W/NEU-THERA 7.5  2D73TE75

## (undated) DEVICE — SU VICRYL 0 UR-6 27" J603H

## (undated) DEVICE — SOL NACL 0.9% INJ 1000ML BAG 07983-09

## (undated) DEVICE — SU VICRYL 4-0 FS-2 27" J422-H

## (undated) DEVICE — STOCKING SLEEVE COMPRESSION CALF MED

## (undated) DEVICE — ENDO CANNULA 05MM VERSAONE UNIVERSAL UNVCA5STF

## (undated) DEVICE — DEVICE ABSORBABLE TACK 5MM SINGLE ABSTACK30

## (undated) RX ORDER — PROPOFOL 10 MG/ML
INJECTION, EMULSION INTRAVENOUS
Status: DISPENSED
Start: 2022-03-07

## (undated) RX ORDER — PROPOFOL 10 MG/ML
INJECTION, EMULSION INTRAVENOUS
Status: DISPENSED
Start: 2018-06-12

## (undated) RX ORDER — GLYCOPYRROLATE 0.2 MG/ML
INJECTION, SOLUTION INTRAMUSCULAR; INTRAVENOUS
Status: DISPENSED
Start: 2022-03-07

## (undated) RX ORDER — PHENYLEPHRINE HCL IN 0.9% NACL 1 MG/10 ML
SYRINGE (ML) INTRAVENOUS
Status: DISPENSED
Start: 2018-06-12

## (undated) RX ORDER — FENTANYL CITRATE 50 UG/ML
INJECTION, SOLUTION INTRAMUSCULAR; INTRAVENOUS
Status: DISPENSED
Start: 2018-06-12

## (undated) RX ORDER — DEXAMETHASONE SODIUM PHOSPHATE 4 MG/ML
INJECTION, SOLUTION INTRA-ARTICULAR; INTRALESIONAL; INTRAMUSCULAR; INTRAVENOUS; SOFT TISSUE
Status: DISPENSED
Start: 2018-06-12

## (undated) RX ORDER — HYDROCODONE BITARTRATE AND ACETAMINOPHEN 5; 325 MG/1; MG/1
TABLET ORAL
Status: DISPENSED
Start: 2018-06-12

## (undated) RX ORDER — BUPIVACAINE HYDROCHLORIDE AND EPINEPHRINE 5; 5 MG/ML; UG/ML
INJECTION, SOLUTION EPIDURAL; INTRACAUDAL; PERINEURAL
Status: DISPENSED
Start: 2018-06-12

## (undated) RX ORDER — LIDOCAINE HYDROCHLORIDE 10 MG/ML
INJECTION, SOLUTION EPIDURAL; INFILTRATION; INTRACAUDAL; PERINEURAL
Status: DISPENSED
Start: 2018-06-12

## (undated) RX ORDER — ONDANSETRON 2 MG/ML
INJECTION INTRAMUSCULAR; INTRAVENOUS
Status: DISPENSED
Start: 2018-06-12

## (undated) RX ORDER — LIDOCAINE HYDROCHLORIDE 10 MG/ML
INJECTION, SOLUTION INFILTRATION; PERINEURAL
Status: DISPENSED
Start: 2022-03-07